# Patient Record
Sex: FEMALE | Race: WHITE | Employment: FULL TIME | URBAN - METROPOLITAN AREA
[De-identification: names, ages, dates, MRNs, and addresses within clinical notes are randomized per-mention and may not be internally consistent; named-entity substitution may affect disease eponyms.]

---

## 2017-04-06 PROBLEM — M75.81 RIGHT ROTATOR CUFF TENDONITIS: Status: ACTIVE | Noted: 2017-04-06

## 2017-04-06 PROBLEM — M75.21 BICEPS TENDONITIS, RIGHT: Status: ACTIVE | Noted: 2017-04-06

## 2017-05-31 ENCOUNTER — OFFICE VISIT (OUTPATIENT)
Dept: DERMATOLOGY CLINIC | Facility: CLINIC | Age: 22
End: 2017-05-31

## 2017-05-31 DIAGNOSIS — D23.30 BENIGN NEOPLASM OF SKIN OF FACE: ICD-10-CM

## 2017-05-31 DIAGNOSIS — D23.60 BENIGN NEOPLASM OF SKIN OF UPPER LIMB, INCLUDING SHOULDER, UNSPECIFIED LATERALITY: ICD-10-CM

## 2017-05-31 DIAGNOSIS — D23.4 BENIGN NEOPLASM OF SCALP AND SKIN OF NECK: ICD-10-CM

## 2017-05-31 DIAGNOSIS — D48.5 NEOPLASM OF UNCERTAIN BEHAVIOR OF SKIN: Primary | ICD-10-CM

## 2017-05-31 DIAGNOSIS — D23.5 BENIGN NEOPLASM OF SKIN OF TRUNK, EXCEPT SCROTUM: ICD-10-CM

## 2017-05-31 DIAGNOSIS — D22.9 MULTIPLE NEVI: ICD-10-CM

## 2017-05-31 PROCEDURE — 99998 NO SHOW: CPT | Performed by: DERMATOLOGY

## 2017-05-31 PROCEDURE — 99203 OFFICE O/P NEW LOW 30 MIN: CPT | Performed by: DERMATOLOGY

## 2017-05-31 PROCEDURE — 11100 BIOPSY OF SKIN LESION: CPT | Performed by: DERMATOLOGY

## 2017-06-02 ENCOUNTER — TELEPHONE (OUTPATIENT)
Dept: DERMATOLOGY CLINIC | Facility: CLINIC | Age: 22
End: 2017-06-02

## 2017-06-03 NOTE — TELEPHONE ENCOUNTER
Please send letter. rtc 1 yr or prn. Please check off in log book letter sent. Please send report to scan.

## 2017-06-03 NOTE — TELEPHONE ENCOUNTER
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      Logged in [employer] box

## 2017-06-19 NOTE — PROGRESS NOTES
Joel Mora is a 24year old female. Patient presents with:  Lesion: New pt here for full body check. Pls see changing mole at L upper arm (newer dark pigment). Pt also wants pink lesion checked at nose. No fam hx of skin ca.              Re performed, including scalp, head, neck, face,nails, hair, external eyes, including conjunctival mucosa, eyelids, lips, external ears, back, chest, abdomen, arms, legs, palms.   Remarkable for lesions as noted     Multiple light to medium brown, well [place] encounter diagnosis)  Benign neoplasm of skin of face  Benign neoplasm of scalp and skin of neck  Benign neoplasm of skin of upper limb, including shoulder, unspecified laterality  Benign neoplasm of skin of trunk, except scrotum  Multiple nevi      Orders

## 2017-10-25 ENCOUNTER — OFFICE VISIT (OUTPATIENT)
Dept: INTERNAL MEDICINE CLINIC | Facility: CLINIC | Age: 22
End: 2017-10-25

## 2017-10-25 VITALS
HEIGHT: 65 IN | TEMPERATURE: 99 F | SYSTOLIC BLOOD PRESSURE: 103 MMHG | WEIGHT: 200 LBS | DIASTOLIC BLOOD PRESSURE: 67 MMHG | BODY MASS INDEX: 33.32 KG/M2 | HEART RATE: 80 BPM

## 2017-10-25 DIAGNOSIS — T78.40XA HYPERSENSITIVITY, INITIAL ENCOUNTER: ICD-10-CM

## 2017-10-25 DIAGNOSIS — J45.21 MILD INTERMITTENT ASTHMA WITH ACUTE EXACERBATION: Primary | ICD-10-CM

## 2017-10-25 PROCEDURE — 99203 OFFICE O/P NEW LOW 30 MIN: CPT | Performed by: INTERNAL MEDICINE

## 2017-10-25 RX ORDER — ALBUTEROL SULFATE 90 UG/1
2 AEROSOL, METERED RESPIRATORY (INHALATION) EVERY 6 HOURS PRN
Qty: 1 INHALER | Refills: 1 | Status: SHIPPED | OUTPATIENT
Start: 2017-10-25 | End: 2019-01-11

## 2017-10-25 RX ORDER — ALBUTEROL SULFATE 90 UG/1
2 AEROSOL, METERED RESPIRATORY (INHALATION) EVERY 6 HOURS PRN
Qty: 1 INHALER | Refills: 1 | Status: SHIPPED | OUTPATIENT
Start: 2017-10-25 | End: 2017-10-25

## 2017-10-25 NOTE — PATIENT INSTRUCTIONS
Controlling Allergens: Dust Mites  Constant exposure to allergens means constant allergy symptoms. That’s why controlling or avoiding the allergens that cause your symptoms is an important part of your treatment.  If you are allergic to dust mites, the ti © 9649-4917 [employer] [address]. [address] [facility], [facility] [place]. All rights reserved. This information is not intended as a substitute for professional medical care. Always follow your healthcare professional's instructions.

## 2017-10-25 NOTE — PROGRESS NOTES
Jessica Sibley is a 20year old female. Patient presents with:  Shortness Of Breath: x2 week  Chest Congestion: Per patient started when visiting friend with dog. Cough: with shortness of breath      HPI:   Here for cough and Shortness of breath. Temp 98.9 °F (37.2 °C) (Oral)   Ht 5' 5\" (1.651 m)   Wt 200 lb (90.7 kg)   LMP 10/02/2017 (Within Days)   BMI 33.28 kg/m²   GENERAL: well developed, well nourished, NAD. SKIN: no rashes, no suspicious lesions.   HEENT: AT/NC, minimal wax noted on the righ 11/22/2017).           Dee Garay MD  10/25/2017  4:09 PM

## 2018-03-02 ENCOUNTER — OFFICE VISIT (OUTPATIENT)
Dept: INTERNAL MEDICINE CLINIC | Facility: CLINIC | Age: 23
End: 2018-03-02

## 2018-03-02 VITALS
HEART RATE: 65 BPM | HEIGHT: 65 IN | RESPIRATION RATE: 16 BRPM | SYSTOLIC BLOOD PRESSURE: 127 MMHG | BODY MASS INDEX: 29.16 KG/M2 | WEIGHT: 175 LBS | DIASTOLIC BLOOD PRESSURE: 86 MMHG

## 2018-03-02 DIAGNOSIS — Z00.00 ROUTINE PHYSICAL EXAMINATION: ICD-10-CM

## 2018-03-02 DIAGNOSIS — H91.93 BILATERAL HEARING LOSS, UNSPECIFIED HEARING LOSS TYPE: ICD-10-CM

## 2018-03-02 DIAGNOSIS — N94.6 DYSMENORRHEA: ICD-10-CM

## 2018-03-02 DIAGNOSIS — Z76.89 ENCOUNTER TO ESTABLISH CARE: Primary | ICD-10-CM

## 2018-03-02 PROCEDURE — 99212 OFFICE O/P EST SF 10 MIN: CPT | Performed by: INTERNAL MEDICINE

## 2018-03-02 PROCEDURE — 99214 OFFICE O/P EST MOD 30 MIN: CPT | Performed by: INTERNAL MEDICINE

## 2018-03-02 NOTE — ASSESSMENT & PLAN NOTE
Problems with dysmenorrhea. This did seem to improve birth control pills. Patient has been advised to give me a call with the names to be able to send in a new prescription.   Pap smear not recently completed and hence advised to set up a Pap in the next

## 2018-03-02 NOTE — PATIENT INSTRUCTIONS
Problem List Items Addressed This Visit        Unprioritized    Bilateral hearing loss     Referral for audiology provided. Relevant Orders    OP REFERRAL TO AUDIOLOGY    Dysmenorrhea     Problems with dysmenorrhea.   This did seem to improve birth

## 2018-03-02 NOTE — PROGRESS NOTES
HPI:    Patient ID: Kiarra Leyva is a 20year old female. New pt transfer of care  There is no immunization history for the selected administration types on file for this patient. Due for a tdap.    lmp 2/18/2018.   Cycles are regular,but severe well-nourished. HENT:   Right Ear: External ear normal.   Left Ear: External ear normal.   Nose: Nose normal.   Mouth/Throat: Oropharynx is clear and moist. No oropharyngeal exudate.    Eyes: Conjunctivae and EOM are normal. Pupils are equal, round, and r normal at this time. Hearing problems as discussed,no ear wax or other identified etiology at this time  Immunizations-  Due for a Tdap and HPV vaccine series. Old records of immunizations have been requested. Will update as appropriate.              Oth

## 2018-03-02 NOTE — ASSESSMENT & PLAN NOTE
Normal exam.  Labs as ordered. Skin check with multiple nevi–dermatology eval has been completed  Vision seem normal at this time.   Hearing problems as discussed,no ear wax or other identified etiology at this time  Immunizations-  Due for a Tdap and HPV

## 2018-07-08 ENCOUNTER — HOSPITAL ENCOUNTER (EMERGENCY)
Facility: HOSPITAL | Age: 23
Discharge: HOME | End: 2018-07-08
Attending: EMERGENCY MEDICINE
Payer: COMMERCIAL

## 2018-07-08 VITALS
HEART RATE: 88 BPM | OXYGEN SATURATION: 98 % | SYSTOLIC BLOOD PRESSURE: 118 MMHG | DIASTOLIC BLOOD PRESSURE: 70 MMHG | WEIGHT: 180 LBS | BODY MASS INDEX: 29.99 KG/M2 | HEIGHT: 65 IN | RESPIRATION RATE: 16 BRPM | TEMPERATURE: 98 F

## 2018-07-08 DIAGNOSIS — L55.9 SUNBURN: Primary | ICD-10-CM

## 2018-07-08 PROCEDURE — 99283 EMERGENCY DEPT VISIT LOW MDM: CPT

## 2018-07-08 PROCEDURE — 96372 THER/PROPH/DIAG INJ SC/IM: CPT

## 2018-07-08 RX ORDER — DIPHENHYDRAMINE HCL 25 MG
25 CAPSULE ORAL EVERY 6 HOURS PRN
COMMUNITY
End: 2018-12-11 | Stop reason: ALTCHOICE

## 2018-07-08 RX ORDER — HYDROXYZINE HYDROCHLORIDE 50 MG/ML
25 INJECTION, SOLUTION INTRAMUSCULAR ONCE
Status: COMPLETED | OUTPATIENT
Start: 2018-07-08 | End: 2018-07-08

## 2018-07-08 RX ORDER — HYDROXYZINE HYDROCHLORIDE 50 MG/ML
25 INJECTION, SOLUTION INTRAMUSCULAR EVERY 4 HOURS PRN
Status: DISCONTINUED | OUTPATIENT
Start: 2018-07-08 | End: 2018-07-08

## 2018-07-08 RX ORDER — IODINE/SODIUM IODIDE 2 %
TINCTURE TOPICAL EVERY 4 HOURS PRN
Status: DISCONTINUED | OUTPATIENT
Start: 2018-07-08 | End: 2018-07-09

## 2018-07-08 RX ORDER — IODINE/SODIUM IODIDE 2 %
TINCTURE TOPICAL
Qty: 1 BOTTLE | Refills: 0 | Status: SHIPPED | OUTPATIENT
Start: 2018-07-08 | End: 2018-12-11 | Stop reason: ALTCHOICE

## 2018-07-09 NOTE — ED NOTES
Pt's family came up to the desk after Calamine lotion applied, \"Is that it, she still can't stand it. \"  informed of this who came to the pt's bedside

## 2018-07-09 NOTE — ED PROVIDER NOTES
Patient Seen in: [facility] Emergency Department    History   Patient presents with:  Burn (integumentary)    Stated Complaint: sunburn    HPI    Patient's 76-year-old woman here with sunburn. She is on the sun without sun block 2 days ago.   Has so tone. Coordination normal.   Skin: Skin is warm and dry. No rash noted. There is erythema. Sunburn noted on her back abdomen. No blistering. Psychiatric: She has a normal mood and affect. Her behavior is normal.   Nursing note and vitals reviewed.

## 2018-12-10 ENCOUNTER — LAB ENCOUNTER (OUTPATIENT)
Dept: LAB | Facility: HOSPITAL | Age: 23
End: 2018-12-10
Attending: INTERNAL MEDICINE
Payer: COMMERCIAL

## 2018-12-10 DIAGNOSIS — Z00.00 ROUTINE PHYSICAL EXAMINATION: ICD-10-CM

## 2018-12-10 PROCEDURE — 84443 ASSAY THYROID STIM HORMONE: CPT

## 2018-12-10 PROCEDURE — 82607 VITAMIN B-12: CPT

## 2018-12-10 PROCEDURE — 85025 COMPLETE CBC W/AUTO DIFF WBC: CPT

## 2018-12-10 PROCEDURE — 81001 URINALYSIS AUTO W/SCOPE: CPT

## 2018-12-10 PROCEDURE — 36415 COLL VENOUS BLD VENIPUNCTURE: CPT

## 2018-12-10 PROCEDURE — 80053 COMPREHEN METABOLIC PANEL: CPT

## 2018-12-10 PROCEDURE — 82306 VITAMIN D 25 HYDROXY: CPT

## 2018-12-11 ENCOUNTER — OFFICE VISIT (OUTPATIENT)
Dept: INTERNAL MEDICINE CLINIC | Facility: CLINIC | Age: 23
End: 2018-12-11

## 2018-12-11 VITALS
WEIGHT: 202 LBS | HEART RATE: 71 BPM | SYSTOLIC BLOOD PRESSURE: 116 MMHG | BODY MASS INDEX: 33.66 KG/M2 | DIASTOLIC BLOOD PRESSURE: 80 MMHG | HEIGHT: 65 IN

## 2018-12-11 DIAGNOSIS — D22.9 ATYPICAL NEVI: ICD-10-CM

## 2018-12-11 DIAGNOSIS — N94.6 DYSMENORRHEA: Primary | ICD-10-CM

## 2018-12-11 DIAGNOSIS — Z01.419 ROUTINE GYNECOLOGICAL EXAMINATION: ICD-10-CM

## 2018-12-11 DIAGNOSIS — L91.0 KELOID OF SKIN: ICD-10-CM

## 2018-12-11 PROCEDURE — 99395 PREV VISIT EST AGE 18-39: CPT | Performed by: INTERNAL MEDICINE

## 2018-12-11 NOTE — PATIENT INSTRUCTIONS
Problem List Items Addressed This Visit        Unprioritized    Dysmenorrhea - Primary     History of severe dysmenorrhea, menorrhagia. Patient has been on birth control pills in the past.  Refills provided today.   Discussed need to avoid smoking, alcohol

## 2018-12-11 NOTE — ASSESSMENT & PLAN NOTE
Normal exam.  Skin check completed per dermatology. Patient has been normal.  There is no immunization history for the selected administration types on file for this patient. Due for Tdap and HPV vaccine series–this has been discussed with the patient.

## 2018-12-11 NOTE — ASSESSMENT & PLAN NOTE
History of severe dysmenorrhea, menorrhagia. Patient has been on birth control pills in the past.  Refills provided today. Discussed need to avoid smoking, alcohol use. Risk for DVT with immobilization has been explained. Follow-up in about 1 year.

## 2018-12-11 NOTE — PROGRESS NOTES
HPI:    Patient ID: Rajni Carpenter is a 24year old female. Pap Smear,3 Years due on 01/01/2018  Recent labs look stable.         Contraception   Progression since onset: History of irregular cycles, painful periods and would like to start on birth has normal reflexes. Skin: Skin is warm and dry. Nursing note and vitals reviewed.              ASSESSMENT/PLAN:     Problem List Items Addressed This Visit        Unprioritized    Routine gynecological examination     Normal exam.  Skin check completed

## 2018-12-24 RX ORDER — METRONIDAZOLE 500 MG/1
500 TABLET ORAL 2 TIMES DAILY
Qty: 10 TABLET | Refills: 0 | Status: SHIPPED | OUTPATIENT
Start: 2018-12-24 | End: 2018-12-29

## 2019-01-11 RX ORDER — ALBUTEROL SULFATE 90 UG/1
2 AEROSOL, METERED RESPIRATORY (INHALATION) EVERY 6 HOURS PRN
Qty: 1 INHALER | Refills: 2 | Status: SHIPPED | OUTPATIENT
Start: 2019-01-11 | End: 2019-10-19

## 2019-01-11 NOTE — TELEPHONE ENCOUNTER
Patient requesting refill for     Albuterol Sulfate  (90 Base) MCG/ACT Inhalation Aero Soln Inhale 2 puffs into the lungs every 6 (six) hours as needed for Wheezing or Shortness of Breath.  Disp: 1 Inhaler Rfl: 1     Please advise

## 2019-01-12 NOTE — TELEPHONE ENCOUNTER
Refill passed per [facility] protocol.   Refill Protocol Appointment Criteria  · Appointment scheduled in the past 6 months or in the next 3 months  Recent Outpatient Visits            1 month ago Felisa, [facility]

## 2019-10-17 ENCOUNTER — TELEPHONE (OUTPATIENT)
Dept: INTERNAL MEDICINE CLINIC | Facility: CLINIC | Age: 24
End: 2019-10-17

## 2019-10-17 NOTE — TELEPHONE ENCOUNTER
Patient called in stating that she needs a wellness exam prior to 12/15 for her employer. 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 According to her previous visit she wasn't seen for a routine physical (just had her pap on 12/11/18), so any day/month should be okay.      Please advise if Patient can be added to schedule for annual physical.

## 2019-10-19 ENCOUNTER — LAB ENCOUNTER (OUTPATIENT)
Dept: LAB | Facility: HOSPITAL | Age: 24
End: 2019-10-19
Attending: INTERNAL MEDICINE
Payer: COMMERCIAL

## 2019-10-19 ENCOUNTER — OFFICE VISIT (OUTPATIENT)
Dept: INTERNAL MEDICINE CLINIC | Facility: CLINIC | Age: 24
End: 2019-10-19

## 2019-10-19 VITALS
SYSTOLIC BLOOD PRESSURE: 118 MMHG | HEIGHT: 65 IN | WEIGHT: 166 LBS | TEMPERATURE: 98 F | OXYGEN SATURATION: 98 % | DIASTOLIC BLOOD PRESSURE: 78 MMHG | HEART RATE: 69 BPM | RESPIRATION RATE: 20 BRPM | BODY MASS INDEX: 27.66 KG/M2

## 2019-10-19 DIAGNOSIS — Z00.00 ROUTINE PHYSICAL EXAMINATION: Primary | ICD-10-CM

## 2019-10-19 DIAGNOSIS — Z00.00 ROUTINE PHYSICAL EXAMINATION: ICD-10-CM

## 2019-10-19 DIAGNOSIS — D22.9 ATYPICAL NEVI: ICD-10-CM

## 2019-10-19 DIAGNOSIS — Z23 NEED FOR VACCINATION: ICD-10-CM

## 2019-10-19 PROCEDURE — 84443 ASSAY THYROID STIM HORMONE: CPT

## 2019-10-19 PROCEDURE — 90686 IIV4 VACC NO PRSV 0.5 ML IM: CPT | Performed by: INTERNAL MEDICINE

## 2019-10-19 PROCEDURE — 99395 PREV VISIT EST AGE 18-39: CPT | Performed by: INTERNAL MEDICINE

## 2019-10-19 PROCEDURE — 80061 LIPID PANEL: CPT

## 2019-10-19 PROCEDURE — 90471 IMMUNIZATION ADMIN: CPT | Performed by: INTERNAL MEDICINE

## 2019-10-19 PROCEDURE — 36415 COLL VENOUS BLD VENIPUNCTURE: CPT

## 2019-10-19 PROCEDURE — 80053 COMPREHEN METABOLIC PANEL: CPT

## 2019-10-19 PROCEDURE — 85025 COMPLETE CBC W/AUTO DIFF WBC: CPT

## 2019-10-19 RX ORDER — ALBUTEROL SULFATE 90 UG/1
2 AEROSOL, METERED RESPIRATORY (INHALATION) EVERY 6 HOURS PRN
Qty: 1 INHALER | Refills: 2 | Status: SHIPPED | OUTPATIENT
Start: 2019-10-19 | End: 2020-10-20

## 2019-10-19 RX ORDER — NORETHINDRONE ACETATE AND ETHINYL ESTRADIOL, ETHINYL ESTRADIOL AND FERROUS FUMARATE 1MG-10(24)
1 KIT ORAL DAILY
Refills: 1 | COMMUNITY
Start: 2019-07-07 | End: 2019-12-15

## 2019-10-19 NOTE — ASSESSMENT & PLAN NOTE
Normal exam.  Labs as ordered. Skin check normal.  No significant abnormal nevi. Scattered nevi present–advised dermatology follow-up once a Polina Quinones, [phone] for an appointment.   Breast exam completed–no palpable abnormalities, discharge from

## 2019-10-19 NOTE — PATIENT INSTRUCTIONS
Problem List Items Addressed This Visit        Unprioritized    Routine physical examination - Primary     Normal exam.  Labs as ordered. Skin check normal.  No significant abnormal nevi.   Scattered nevi present–advised dermatology follow-up once a year–D

## 2019-10-19 NOTE — PROGRESS NOTES
REASON FOR VISIT:    Isael Hoover is a 21year old female who presents for an [address].     Pap Smear,3 Years due on 12/11/2021  Immunization History   Administered Date(s) Administered   • Tb Intradermal Test 09/03/2016     Flu shot Diabetes Screening  if history of high blood pressure or other  risk factors No results found for: A1C  Glucose (mg/dL)   Date Value   12/10/2018 94         Gonorrhea Screening if high risk No results found for: GONOCOCCUS    HIV Screening For all adults past medical history.    Past Surgical History:   Procedure Laterality Date   • ADENOIDECTOMY     • TONSILLECTOMY        Family History   Problem Relation Age of Onset   • Arthritis Mother    • Hypertension Mother    • Obesity Mother    • Depression Mother murmur  GI: good BS's, no masses, HSM or tenderness  Genitourinary:    External Gyn:  Pubic hair is normally distributed.  External genitalia is unremarkable. Glands do appear to be normal.  Perineum is unremarkable.  No perianal abnormalities.    Urethra i ASSAY, THYROID STIM HORMONE    URINALYSIS, ROUTINE      Other Visit Diagnoses     Need for vaccination        Relevant Orders    FLULAVAL INFLUENZA VACCINE QUAD PRESERVATIVE FREE 0.5 ML    Atypical nevi        Relevant Orders    DERM - INTERNAL

## 2019-10-19 NOTE — TELEPHONE ENCOUNTER
Patient contacted. Advised to see me tomorrow at 9:45 AM at the [facility].

## 2019-12-16 RX ORDER — NORETHINDRONE ACETATE AND ETHINYL ESTRADIOL, ETHINYL ESTRADIOL AND FERROUS FUMARATE 1MG-10(24)
KIT ORAL
Qty: 84 TABLET | Refills: 1 | Status: SHIPPED | OUTPATIENT
Start: 2019-12-16 | End: 2020-05-27

## 2019-12-16 NOTE — TELEPHONE ENCOUNTER
Refill passed per [facility] protocol.   Gynecology Medications  Protocol Criteria:  · Appointment scheduled in the past 12 months or the next 3 months  · Pap smear in the past 12 months  · Pap smear WNL manually verified  Recent Outpatient Visits

## 2019-12-18 ENCOUNTER — HOSPITAL ENCOUNTER (OUTPATIENT)
Age: 24
Discharge: HOME | End: 2019-12-18
Attending: FAMILY MEDICINE
Payer: COMMERCIAL

## 2019-12-18 VITALS
DIASTOLIC BLOOD PRESSURE: 68 MMHG | HEART RATE: 72 BPM | TEMPERATURE: 98 F | OXYGEN SATURATION: 99 % | HEIGHT: 65 IN | SYSTOLIC BLOOD PRESSURE: 117 MMHG | BODY MASS INDEX: 28.32 KG/M2 | WEIGHT: 170 LBS | RESPIRATION RATE: 18 BRPM

## 2019-12-18 DIAGNOSIS — J02.9 ACUTE PHARYNGITIS, UNSPECIFIED ETIOLOGY: Primary | ICD-10-CM

## 2019-12-18 DIAGNOSIS — J06.9 ACUTE URI: ICD-10-CM

## 2019-12-18 PROCEDURE — 87081 CULTURE SCREEN ONLY: CPT

## 2019-12-18 PROCEDURE — 99202 OFFICE O/P NEW SF 15 MIN: CPT

## 2019-12-18 PROCEDURE — 87430 STREP A AG IA: CPT

## 2019-12-18 PROCEDURE — 99212 OFFICE O/P EST SF 10 MIN: CPT

## 2019-12-18 RX ORDER — IBUPROFEN 600 MG/1
600 TABLET ORAL EVERY 8 HOURS PRN
Qty: 30 TABLET | Refills: 0 | Status: SHIPPED | OUTPATIENT
Start: 2019-12-18 | End: 2019-12-25

## 2019-12-18 NOTE — ED PROVIDER NOTES
Patient Seen in: [facility]      History   Patient presents with:  Sore Throat    Stated Complaint: sore throat x 3 days    HPI    40-year-old female presents with complaints of sore throat for 3 days associated with nasal pain   Salt water gargles   If has worsening symptoms, drooling from mouth and unable to swallow go to ER                   Disposition and Plan     Clinical Impression:  Acute pharyngitis, unspecified etiology  (primary encounter diagnosis)  Acute URI

## 2019-12-21 NOTE — ED NOTES
Attempted to contact the patient regarding neg strep culture results but was unsuccessful. Left a message for the patient to call us back.

## 2020-03-13 ENCOUNTER — NURSE TRIAGE (OUTPATIENT)
Dept: INTERNAL MEDICINE CLINIC | Facility: CLINIC | Age: 25
End: 2020-03-13

## 2020-03-13 NOTE — TELEPHONE ENCOUNTER
Action Requested: Summary for Provider     []  Critical Lab, Recommendations Needed  [] Need Additional Advice  []   FYI    []   Need Orders  [] Need Medications Sent to Pharmacy  []  Other     SUMMARY: Patient c/o recurrent headache 7/10 around the top of

## 2020-05-27 RX ORDER — NORETHINDRONE ACETATE AND ETHINYL ESTRADIOL, ETHINYL ESTRADIOL AND FERROUS FUMARATE 1MG-10(24)
KIT ORAL
Qty: 84 TABLET | Refills: 1 | Status: SHIPPED | OUTPATIENT
Start: 2020-05-27 | End: 2020-12-28

## 2020-09-30 ENCOUNTER — HOSPITAL ENCOUNTER (OUTPATIENT)
Age: 25
Discharge: HOME | End: 2020-09-30
Payer: COMMERCIAL

## 2020-09-30 VITALS
TEMPERATURE: 98 F | BODY MASS INDEX: 31.65 KG/M2 | RESPIRATION RATE: 18 BRPM | OXYGEN SATURATION: 98 % | HEIGHT: 65 IN | HEART RATE: 92 BPM | DIASTOLIC BLOOD PRESSURE: 76 MMHG | WEIGHT: 190 LBS | SYSTOLIC BLOOD PRESSURE: 141 MMHG

## 2020-09-30 DIAGNOSIS — Z20.822 ENCOUNTER FOR SCREENING LABORATORY TESTING FOR COVID-19 VIRUS: ICD-10-CM

## 2020-09-30 DIAGNOSIS — R09.81 NASAL CONGESTION: Primary | ICD-10-CM

## 2020-09-30 PROCEDURE — 99202 OFFICE O/P NEW SF 15 MIN: CPT | Performed by: PHYSICIAN ASSISTANT

## 2020-09-30 RX ORDER — ALBUTEROL SULFATE 90 UG/1
2 AEROSOL, METERED RESPIRATORY (INHALATION) EVERY 4 HOURS PRN
Qty: 1 INHALER | Refills: 0 | Status: SHIPPED | OUTPATIENT
Start: 2020-09-30 | End: 2020-10-20

## 2020-10-01 NOTE — ED PROVIDER NOTES
Patient Seen in: [facility]      History   Patient presents with:  Testing    Stated Complaint: covid test - sob and chest tightness x 3 days     HPI    43-year-old female presents to the clinic for evaluation of nasal fidelia External ear normal.      Left Ear: External ear normal.      Nose: Rhinorrhea present. Mouth/Throat:      Mouth: Mucous membranes are moist.      Pharynx: Oropharynx is clear.    Eyes:      Conjunctiva/sclera: Conjunctivae normal.   Neck:      Musculo Wheezing. Qty: 1 Inhaler Refills: 0    !! - Potential duplicate medications found. Please discuss with provider.

## 2020-10-01 NOTE — ED INITIAL ASSESSMENT (HPI)
Pt here c/o sob and chest discomfort for last couple days. Hx of asthma, using inhaler. Pt here for covid testing.

## 2020-10-03 ENCOUNTER — PATIENT MESSAGE (OUTPATIENT)
Dept: INTERNAL MEDICINE CLINIC | Facility: CLINIC | Age: 25
End: 2020-10-03

## 2020-10-03 ENCOUNTER — TELEPHONE (OUTPATIENT)
Dept: INTERNAL MEDICINE CLINIC | Facility: CLINIC | Age: 25
End: 2020-10-03

## 2020-10-03 NOTE — TELEPHONE ENCOUNTER
----- Message from Chito Montano sent at 10/3/2020  9:36 AM CDT -----  Regarding: Other  Contact: [phone]  Gi Bojorquez Speaker been having some COVID like symptoms, so I had a test done and it came back negative.  I'm still experiencing s

## 2020-10-03 NOTE — TELEPHONE ENCOUNTER
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  I'm still experiencing shortness of breath, hea

## 2020-10-05 NOTE — TELEPHONE ENCOUNTER
The patient stated she does not have any symptoms now and returned to work    The patient denies any shortness of breath, chest pains, fatigue,, soreness, wheezing or chest tightness. She stated feels fine. She will call back if the symptoms return.

## 2020-10-18 ENCOUNTER — PATIENT MESSAGE (OUTPATIENT)
Dept: INTERNAL MEDICINE CLINIC | Facility: CLINIC | Age: 25
End: 2020-10-18

## 2020-10-18 ENCOUNTER — TELEPHONE (OUTPATIENT)
Dept: FAMILY MEDICINE CLINIC | Facility: CLINIC | Age: 25
End: 2020-10-18

## 2020-10-18 NOTE — TELEPHONE ENCOUNTER
From: Kathy Rodriguez  To:  Trey Durbin MD  Sent: 10/18/2020 11:45 AM CDT  Subject: Non-Urgent Erleen Labor Dr. Morena Boswell,     I have been having trouble breathing and tightness in my chest. I have been using my inhaler several times a day,

## 2020-10-18 NOTE — TELEPHONE ENCOUNTER
Advised ER evaluation. Triage RN, please follow up. Thanks.     Non-Urgent Medical Berny Trejo MD 2 hours ago (11:45 AM)        Gi Grove,      I have been having trouble breathing and tightness in my chest. I hav

## 2020-10-19 NOTE — TELEPHONE ENCOUNTER
Yoav Farrell MD  [place] 16 hours ago (3:23 PM)   NOELLE please assist per Dr luz Castro,  Please set up a video visit-doximity for an eval-end of the day any day after 7 pm.  Please have staff set this up.   Thanks,  Dr Alex Lopez

## 2020-10-20 ENCOUNTER — TELEMEDICINE (OUTPATIENT)
Dept: INTERNAL MEDICINE CLINIC | Facility: CLINIC | Age: 25
End: 2020-10-20

## 2020-10-20 DIAGNOSIS — J45.40 MODERATE PERSISTENT ASTHMA WITHOUT COMPLICATION: Primary | ICD-10-CM

## 2020-10-20 PROCEDURE — 99214 OFFICE O/P EST MOD 30 MIN: CPT | Performed by: INTERNAL MEDICINE

## 2020-10-20 RX ORDER — ALBUTEROL SULFATE 90 UG/1
2 AEROSOL, METERED RESPIRATORY (INHALATION) EVERY 6 HOURS PRN
Qty: 3 INHALER | Refills: 3 | Status: SHIPPED | OUTPATIENT
Start: 2020-10-20 | End: 2021-11-10

## 2020-10-20 RX ORDER — MONTELUKAST SODIUM 10 MG/1
10 TABLET ORAL DAILY
Qty: 90 TABLET | Refills: 1 | Status: SHIPPED | OUTPATIENT
Start: 2020-10-20 | End: 2021-10-15

## 2020-10-20 RX ORDER — FLUTICASONE PROPIONATE AND SALMETEROL 100; 50 UG/1; UG/1
1 POWDER RESPIRATORY (INHALATION) 2 TIMES DAILY
Qty: 3 PACKAGE | Refills: 3 | Status: SHIPPED | OUTPATIENT
Start: 2020-10-20 | End: 2020-12-28

## 2020-10-21 NOTE — PROGRESS NOTES
HPI:    Patient ID: Olive Duarte is a 31year old female.   Telehealth outside of [facility] [address] Verbal Consent   I conducted a telehealth visit with Olive Duarte today, 10/20/20, which was completed using two-way, real-time interactive a uses albuterol about 4-5 times a day but continues to wheeze, no fevers, chills, Covid negative, no sore throat. ). Associated symptoms include headaches (sinus pressure) and wheezing.  Pertinent negatives include no abdominal pain, chest pain, fever, leg pa TIME DAILY  84 tablet 1     Allergies:No Known Allergies     There were no vitals filed for this visit. There is no height or weight on file to calculate BMI. PHYSICAL EXAM:   Physical Exam   Constitutional: She is oriented to person, place, and time. albuterol 2 puffs 2-3 times a day if needed as a rescue inhaler. Claritin 10 mg 1 tablet once daily over-the-counter, Singulair 10 mg 1 tablet once daily as a prescription to be picked up from the local pharmacy. Call in the next 2 weeks if not better.

## 2020-10-21 NOTE — PATIENT INSTRUCTIONS
Problem List Items Addressed This Visit        Unprioritized    Moderate persistent asthma without complication - Primary     Patient with a history of mild intermittent asthma on exposure to allergens and animals.   Over the past 4 weeks when the weather t

## 2020-12-28 RX ORDER — NORETHINDRONE ACETATE AND ETHINYL ESTRADIOL, ETHINYL ESTRADIOL AND FERROUS FUMARATE 1MG-10(24)
1 KIT ORAL DAILY
Qty: 84 TABLET | Refills: 1 | Status: SHIPPED | OUTPATIENT
Start: 2020-12-28 | End: 2021-05-02

## 2020-12-28 RX ORDER — FLUTICASONE PROPIONATE AND SALMETEROL 100; 50 UG/1; UG/1
1 POWDER RESPIRATORY (INHALATION) 2 TIMES DAILY
Qty: 3 PACKAGE | Refills: 3 | Status: SHIPPED | OUTPATIENT
Start: 2020-12-28 | End: 2021-12-23

## 2021-02-01 DIAGNOSIS — Z23 NEED FOR VACCINATION: ICD-10-CM

## 2021-02-03 ENCOUNTER — IMMUNIZATION (OUTPATIENT)
Dept: LAB | Age: 26
End: 2021-02-03
Attending: HOSPITALIST
Payer: COMMERCIAL

## 2021-02-03 DIAGNOSIS — Z23 NEED FOR VACCINATION: Primary | ICD-10-CM

## 2021-02-03 PROCEDURE — 0001A SARSCOV2 VAC 30MCG/0.3ML IM: CPT

## 2021-02-24 ENCOUNTER — IMMUNIZATION (OUTPATIENT)
Dept: LAB | Age: 26
End: 2021-02-24
Attending: HOSPITALIST
Payer: COMMERCIAL

## 2021-02-24 DIAGNOSIS — Z23 NEED FOR VACCINATION: Primary | ICD-10-CM

## 2021-02-24 PROCEDURE — 0002A SARSCOV2 VAC 30MCG/0.3ML IM: CPT

## 2021-05-02 RX ORDER — NORETHINDRONE ACETATE AND ETHINYL ESTRADIOL, ETHINYL ESTRADIOL AND FERROUS FUMARATE 1MG-10(24)
1 KIT ORAL DAILY
Qty: 84 TABLET | Refills: 1 | Status: SHIPPED | OUTPATIENT
Start: 2021-05-02 | End: 2021-07-22

## 2021-07-22 RX ORDER — NORETHINDRONE ACETATE AND ETHINYL ESTRADIOL, ETHINYL ESTRADIOL AND FERROUS FUMARATE 1MG-10(24)
1 KIT ORAL DAILY
Qty: 84 TABLET | Refills: 1 | Status: SHIPPED | OUTPATIENT
Start: 2021-07-22 | End: 2021-11-10

## 2021-07-22 NOTE — TELEPHONE ENCOUNTER
Refill passed per [place] [facility] protocol. Requested Prescriptions   Pending Prescriptions Disp Refills    Norethin-Eth Estrad-Fe Biphas (LO LOESTRIN FE) 1 MG-10 MCG / 10 MCG Oral Tab 84 tablet 1     Sig: Take 1 tablet by mouth daily.         Gynecology Rosangela Estes

## 2021-10-28 ENCOUNTER — OFFICE VISIT (OUTPATIENT)
Dept: INTERNAL MEDICINE CLINIC | Facility: CLINIC | Age: 26
End: 2021-10-28

## 2021-10-28 VITALS
WEIGHT: 202 LBS | RESPIRATION RATE: 16 BRPM | BODY MASS INDEX: 33.66 KG/M2 | HEIGHT: 65 IN | TEMPERATURE: 99 F | DIASTOLIC BLOOD PRESSURE: 81 MMHG | HEART RATE: 75 BPM | SYSTOLIC BLOOD PRESSURE: 119 MMHG

## 2021-10-28 DIAGNOSIS — Z23 NEED FOR VACCINATION: ICD-10-CM

## 2021-10-28 DIAGNOSIS — Z01.419 ENCOUNTER FOR GYNECOLOGICAL EXAMINATION WITHOUT ABNORMAL FINDING: ICD-10-CM

## 2021-10-28 DIAGNOSIS — G44.219 EPISODIC TENSION-TYPE HEADACHE, NOT INTRACTABLE: ICD-10-CM

## 2021-10-28 DIAGNOSIS — Z00.00 ROUTINE PHYSICAL EXAMINATION: Primary | ICD-10-CM

## 2021-10-28 PROBLEM — G44.209 TENSION TYPE HEADACHE: Status: ACTIVE | Noted: 2021-10-28

## 2021-10-28 PROCEDURE — 90471 IMMUNIZATION ADMIN: CPT | Performed by: INTERNAL MEDICINE

## 2021-10-28 PROCEDURE — 3079F DIAST BP 80-89 MM HG: CPT | Performed by: INTERNAL MEDICINE

## 2021-10-28 PROCEDURE — 99213 OFFICE O/P EST LOW 20 MIN: CPT | Performed by: INTERNAL MEDICINE

## 2021-10-28 PROCEDURE — 99395 PREV VISIT EST AGE 18-39: CPT | Performed by: INTERNAL MEDICINE

## 2021-10-28 PROCEDURE — 3074F SYST BP LT 130 MM HG: CPT | Performed by: INTERNAL MEDICINE

## 2021-10-28 PROCEDURE — 3008F BODY MASS INDEX DOCD: CPT | Performed by: INTERNAL MEDICINE

## 2021-10-28 PROCEDURE — 90686 IIV4 VACC NO PRSV 0.5 ML IM: CPT | Performed by: INTERNAL MEDICINE

## 2021-10-28 RX ORDER — TIZANIDINE 2 MG/1
2 TABLET ORAL NIGHTLY
Qty: 30 TABLET | Refills: 2 | Status: SHIPPED | OUTPATIENT
Start: 2021-10-28

## 2021-10-28 NOTE — ASSESSMENT & PLAN NOTE
Longstanding history of headaches–about 1 or 2/month. Usually frontal, occipital and occasionally to the sides. No visual aura. No nausea or vomiting. Sometimes last for almost the entire day.   No phonophobia, photophobia, altered mental status, numbne

## 2021-10-28 NOTE — PROGRESS NOTES
HPI:   Ryley Dc is a 31year old female who presents for an Annual Health Visit.        Allergies:   No Known Allergies    CURRENT MEDICATIONS   Current Outpatient Medications   Medication Sig Dispense Refill   • tiZANidine 2 MG Oral Tab Take (Temporal)   Resp 16   Ht 5' 5\" (1.651 m)   Wt 202 lb (91.6 kg)   LMP 10/01/2021   BMI 33.61 kg/m²    Wt Readings from Last 6 Encounters:  10/28/21 : 202 lb (91.6 kg)  09/30/20 : 190 lb (86.2 kg)  12/18/19 : 170 lb (77.1 kg)  10/19/19 : 166 lb (75.3 kg) unremarkable.  No perianal abnormalities.    Urethra is normal in appearance, without erythema.  Urethra meatus is normal.    Internal Gyn:     Vaginal mucosa appears normal. Cervix normal to inspection and palpation.  Uterus normal in size and position.  a by mouth nightly. Patient Instructions     Problem List Items Addressed This Visit        Unprioritized    Encounter for routine gynecological examination     Pelvic exam completed, Pap completed.   HPV vaccine series recommended, patient has not com day.  Zyrtec 10 mg 1 tablet once daily at bedtime. If symptoms do not improve–will need a follow-up evaluation. Tizanidine 2 mg at bedtime to help with muscle tension. She is on Loestrin Fe which too may contribute to headaches.   Maintain a headache moni

## 2021-10-28 NOTE — PATIENT INSTRUCTIONS
Problem List Items Addressed This Visit        Unprioritized    Encounter for routine gynecological examination     Pelvic exam completed, Pap completed. HPV vaccine series recommended, patient has not completed it as yet. Tdap recommended.          Parry Runner bedtime. If symptoms do not improve–will need a follow-up evaluation. Tizanidine 2 mg at bedtime to help with muscle tension. She is on Loestrin Fe which too may contribute to headaches. Maintain a headache diary as discussed today.   Follow-up after la

## 2021-10-28 NOTE — ASSESSMENT & PLAN NOTE
Normal exam.  Labs as ordered. Skin check normal.  Multiple scattered nevi and freckles present. Patient is advised to follow-up with dermatology for an evaluation.   Breast exam completed–no palpable abnormalities, discharge from the nipples or axillary

## 2021-10-28 NOTE — ASSESSMENT & PLAN NOTE
Pelvic exam completed, Pap completed. HPV vaccine series recommended, patient has not completed it as yet. Tdap recommended.

## 2021-11-10 RX ORDER — ALBUTEROL SULFATE 90 UG/1
2 AEROSOL, METERED RESPIRATORY (INHALATION) EVERY 6 HOURS PRN
Qty: 1 EACH | Refills: 2 | Status: SHIPPED | OUTPATIENT
Start: 2021-11-10

## 2021-11-10 RX ORDER — NORETHINDRONE ACETATE AND ETHINYL ESTRADIOL, ETHINYL ESTRADIOL AND FERROUS FUMARATE 1MG-10(24)
1 KIT ORAL DAILY
Qty: 84 TABLET | Refills: 1 | Status: SHIPPED | OUTPATIENT
Start: 2021-11-10

## 2021-11-11 RX ORDER — NORETHINDRONE ACETATE AND ETHINYL ESTRADIOL, ETHINYL ESTRADIOL AND FERROUS FUMARATE 1MG-10(24)
1 KIT ORAL DAILY
Qty: 84 TABLET | Refills: 2 | Status: SHIPPED | OUTPATIENT
Start: 2021-11-11

## 2021-11-11 RX ORDER — ALBUTEROL SULFATE 90 UG/1
2 AEROSOL, METERED RESPIRATORY (INHALATION) EVERY 6 HOURS PRN
Qty: 3 EACH | Refills: 1 | Status: SHIPPED | OUTPATIENT
Start: 2021-11-11

## 2021-11-11 RX ORDER — ALBUTEROL SULFATE 90 UG/1
2 AEROSOL, METERED RESPIRATORY (INHALATION) EVERY 6 HOURS PRN
Refills: 0 | OUTPATIENT
Start: 2021-11-11

## 2021-11-11 RX ORDER — NORETHINDRONE ACETATE AND ETHINYL ESTRADIOL, ETHINYL ESTRADIOL AND FERROUS FUMARATE 1MG-10(24)
1 KIT ORAL DAILY
Qty: 84 TABLET | Refills: 1 | OUTPATIENT
Start: 2021-11-11

## 2021-11-11 NOTE — TELEPHONE ENCOUNTER
Last PAP 10/28/2021      Refill passed per [facility] protocol.   Requested Prescriptions   Pending Prescriptions Disp Refills    albuterol 108 (90 Base) MCG/ACT Inhalation Aero Soln  0     Sig: Inhale 2 puffs into the lungs every 6 (six) hours as need

## 2021-12-11 ENCOUNTER — IMMUNIZATION (OUTPATIENT)
Dept: LAB | Facility: HOSPITAL | Age: 26
End: 2021-12-11
Attending: EMERGENCY MEDICINE
Payer: COMMERCIAL

## 2021-12-11 DIAGNOSIS — Z23 NEED FOR VACCINATION: Primary | ICD-10-CM

## 2021-12-11 PROCEDURE — 0004A SARSCOV2 VAC 30MCG/0.3ML IM: CPT

## 2021-12-14 ENCOUNTER — LAB ENCOUNTER (OUTPATIENT)
Dept: LAB | Facility: HOSPITAL | Age: 26
End: 2021-12-14
Attending: INTERNAL MEDICINE
Payer: COMMERCIAL

## 2021-12-14 DIAGNOSIS — Z00.00 ROUTINE PHYSICAL EXAMINATION: ICD-10-CM

## 2021-12-14 PROCEDURE — 82306 VITAMIN D 25 HYDROXY: CPT

## 2021-12-14 PROCEDURE — 84443 ASSAY THYROID STIM HORMONE: CPT

## 2021-12-14 PROCEDURE — 36415 COLL VENOUS BLD VENIPUNCTURE: CPT

## 2021-12-14 PROCEDURE — 82607 VITAMIN B-12: CPT

## 2021-12-14 PROCEDURE — 80061 LIPID PANEL: CPT

## 2021-12-14 PROCEDURE — 85025 COMPLETE CBC W/AUTO DIFF WBC: CPT

## 2021-12-14 PROCEDURE — 80053 COMPREHEN METABOLIC PANEL: CPT

## 2021-12-28 ENCOUNTER — PATIENT MESSAGE (OUTPATIENT)
Dept: INTERNAL MEDICINE CLINIC | Facility: CLINIC | Age: 26
End: 2021-12-28

## 2021-12-28 ENCOUNTER — TELEPHONE (OUTPATIENT)
Dept: FAMILY MEDICINE CLINIC | Facility: CLINIC | Age: 26
End: 2021-12-28

## 2021-12-28 DIAGNOSIS — Z20.822 EXPOSURE TO COVID-19 VIRUS: Primary | ICD-10-CM

## 2021-12-29 ENCOUNTER — NURSE ONLY (OUTPATIENT)
Dept: LAB | Age: 26
End: 2021-12-29
Attending: FAMILY MEDICINE
Payer: COMMERCIAL

## 2021-12-29 DIAGNOSIS — Z20.822 EXPOSURE TO COVID-19 VIRUS: ICD-10-CM

## 2021-12-29 NOTE — TELEPHONE ENCOUNTER
Call placed to patient who endorses mild symptoms following an exposure on 12/25 to a person now known to have been COVID positive. Symptoms include headache, chest congestion and sore throat. Order entered. Care advice provided.  ED precautions revi

## 2021-12-29 NOTE — TELEPHONE ENCOUNTER
From: Lisa Rodriguez  To: Richard Iraheta MD  Sent: 12/28/2021 9:58 AM CST  Subject: Gigi Laboy,     Where can I go to get Covid tested? I was in close contact with someone who has Covid. I need a test as soon as possible.

## 2021-12-29 NOTE — TELEPHONE ENCOUNTER
RN pls call pt and pls triage or offer ov if needed, thanks. Alinity pended.            To: KAREN OZUNA TRIAGE      From: Lexi Donnelly      Created: 12/28/2021 9:43 PM      I have a tight chest, headaches and slight sore throat.              : KAREN MANRIQUE

## 2021-12-30 ENCOUNTER — LAB ENCOUNTER (OUTPATIENT)
Dept: LAB | Facility: HOSPITAL | Age: 26
End: 2021-12-30
Attending: INTERNAL MEDICINE
Payer: COMMERCIAL

## 2021-12-30 PROCEDURE — 81001 URINALYSIS AUTO W/SCOPE: CPT

## 2022-01-01 LAB — SARS-COV-2 RNA RESP QL NAA+PROBE: NOT DETECTED

## 2022-05-29 ENCOUNTER — HOSPITAL ENCOUNTER (OUTPATIENT)
Age: 27
Discharge: HOME | End: 2022-05-29
Payer: COMMERCIAL

## 2022-05-29 VITALS
RESPIRATION RATE: 16 BRPM | DIASTOLIC BLOOD PRESSURE: 71 MMHG | BODY MASS INDEX: 31.65 KG/M2 | WEIGHT: 190 LBS | TEMPERATURE: 98 F | OXYGEN SATURATION: 99 % | HEART RATE: 72 BPM | SYSTOLIC BLOOD PRESSURE: 122 MMHG | HEIGHT: 65 IN

## 2022-05-29 DIAGNOSIS — U07.1 COVID-19: Primary | ICD-10-CM

## 2022-05-29 LAB
S PYO AG THROAT QL: NEGATIVE
SARS-COV-2 RNA RESP QL NAA+PROBE: DETECTED

## 2023-11-11 ENCOUNTER — HOSPITAL ENCOUNTER (OUTPATIENT)
Age: 28
Discharge: HOME | End: 2023-11-11
Payer: COMMERCIAL

## 2023-11-11 ENCOUNTER — APPOINTMENT (OUTPATIENT)
Dept: GENERAL RADIOLOGY | Age: 28
End: 2023-11-11
Attending: PHYSICIAN ASSISTANT
Payer: COMMERCIAL

## 2023-11-11 VITALS
DIASTOLIC BLOOD PRESSURE: 76 MMHG | TEMPERATURE: 97 F | SYSTOLIC BLOOD PRESSURE: 116 MMHG | RESPIRATION RATE: 18 BRPM | HEART RATE: 82 BPM | OXYGEN SATURATION: 97 %

## 2023-11-11 DIAGNOSIS — R06.2 WHEEZING: Primary | ICD-10-CM

## 2023-11-11 DIAGNOSIS — R09.82 PND (POST-NASAL DRIP): ICD-10-CM

## 2023-11-11 LAB — SARS-COV-2 RNA RESP QL NAA+PROBE: NOT DETECTED

## 2023-11-11 PROCEDURE — 94640 AIRWAY INHALATION TREATMENT: CPT | Performed by: PHYSICIAN ASSISTANT

## 2023-11-11 PROCEDURE — U0002 COVID-19 LAB TEST NON-CDC: HCPCS | Performed by: PHYSICIAN ASSISTANT

## 2023-11-11 PROCEDURE — 99213 OFFICE O/P EST LOW 20 MIN: CPT | Performed by: PHYSICIAN ASSISTANT

## 2023-11-11 PROCEDURE — 71046 X-RAY EXAM CHEST 2 VIEWS: CPT | Performed by: PHYSICIAN ASSISTANT

## 2023-11-11 RX ORDER — PREDNISONE 20 MG/1
40 TABLET ORAL DAILY
Qty: 8 TABLET | Refills: 0 | Status: SHIPPED | OUTPATIENT
Start: 2023-11-11 | End: 2023-11-15

## 2023-11-11 RX ORDER — IPRATROPIUM BROMIDE AND ALBUTEROL SULFATE 2.5; .5 MG/3ML; MG/3ML
3 SOLUTION RESPIRATORY (INHALATION) ONCE
Status: COMPLETED | OUTPATIENT
Start: 2023-11-11 | End: 2023-11-11

## 2023-11-11 RX ORDER — ALBUTEROL SULFATE 90 UG/1
2 AEROSOL, METERED RESPIRATORY (INHALATION) EVERY 4 HOURS PRN
Qty: 1 EACH | Refills: 0 | Status: SHIPPED | OUTPATIENT
Start: 2023-11-11 | End: 2023-12-11

## 2023-11-11 NOTE — ED INITIAL ASSESSMENT (HPI)
Pt with seasonal allergies and asthma.   For the past week she has headaches, ear pressure, and difficult to take a deep breath and her inhaler only helps short term

## 2023-11-11 NOTE — DISCHARGE INSTRUCTIONS
Please return to the ER/clinic if symptoms worsen. Follow-up with your PCP in 24-48 hours as needed. Push fluids. The Decadron will work in your system the next several days. You may start the additional prednisone on day 2 or 3 if symptoms persist.  Your inhaler every 4-6 hours as needed. Recommend sleeping more upright. Take an over-the-counter antihistamine daily i.e. Zyrtec. Use Chloraseptic spray to help stop the cough trigger reflex. If symptoms persist or worsen i.e. increasing wheezing shortness of breath or fevers go directly to the emergency room. Otherwise follow-up with your primary care physician for further evaluation and treatment.

## 2024-05-11 ENCOUNTER — HOSPITAL ENCOUNTER (OUTPATIENT)
Age: 29
Discharge: HOME | End: 2024-05-11

## 2024-05-11 ENCOUNTER — APPOINTMENT (OUTPATIENT)
Dept: GENERAL RADIOLOGY | Age: 29
End: 2024-05-11
Attending: NURSE PRACTITIONER

## 2024-05-11 VITALS
BODY MASS INDEX: 29.99 KG/M2 | WEIGHT: 180 LBS | HEART RATE: 71 BPM | HEIGHT: 65 IN | SYSTOLIC BLOOD PRESSURE: 125 MMHG | TEMPERATURE: 98 F | DIASTOLIC BLOOD PRESSURE: 67 MMHG | OXYGEN SATURATION: 100 % | RESPIRATION RATE: 20 BRPM

## 2024-05-11 DIAGNOSIS — M25.572 ACUTE LEFT ANKLE PAIN: Primary | ICD-10-CM

## 2024-05-11 DIAGNOSIS — S93.402A MILD SPRAIN OF LEFT ANKLE, INITIAL ENCOUNTER: ICD-10-CM

## 2024-05-11 PROCEDURE — 73610 X-RAY EXAM OF ANKLE: CPT | Performed by: NURSE PRACTITIONER

## 2024-05-11 PROCEDURE — E0114 CRUTCH UNDERARM PAIR NO WOOD: HCPCS | Performed by: NURSE PRACTITIONER

## 2024-05-11 PROCEDURE — 99213 OFFICE O/P EST LOW 20 MIN: CPT | Performed by: NURSE PRACTITIONER

## 2024-05-11 PROCEDURE — L4350 ANKLE CONTROL ORTHO PRE OTS: HCPCS | Performed by: NURSE PRACTITIONER

## 2024-05-11 RX ORDER — ARM BRACE
EACH MISCELLANEOUS
Qty: 1 EACH | Refills: 0 | Status: SHIPPED | OUTPATIENT
Start: 2024-05-11

## 2024-05-11 NOTE — ED PROVIDER NOTES
Patient Seen in: [facility]      History     Chief Complaint   Patient presents with    Leg or Foot Injury     Stated Complaint: Lt foot injury    Subjective:   27 yo female presents with left ankle pain that began 1 day ago after playing ball outside with her son.   Pt states she landed on uneven surface causing her to roll her ankle.  Today the ankle is more swollen.   There was no other trauma.   Having pain when walking.   No prior injuries to this ankle/foot.    Pt denies numbness, tingling.         The history is provided by the patient.         Objective:   History reviewed. No pertinent past medical history.           Past Surgical History:   Procedure Laterality Date    Adenoidectomy      Tonsillectomy                  Social History     Socioeconomic History    Marital status:    Tobacco Use    Smoking status: Never    Smokeless tobacco: Never   Vaping Use    Vaping status: Never Used   Substance and Sexual Activity    Alcohol use: No     Alcohol/week: 0.0 standard drinks of alcohol    Drug use: No    Sexual activity: Yes     Birth control/protection: Pill   Other Topics Concern    Pt has a pacemaker No    Reaction to local anesthetic No              Review of Systems   All other systems reviewed and are negative.      Positive for stated complaint: Lt foot injury  Other systems are as noted in HPI.  Constitutional and vital signs reviewed.      All other systems reviewed and negative except as noted above.    Physical Exam     ED Triage Vitals [05/11/24 1045]   /67   Pulse 71   Resp 20   Temp 97.5 °F (36.4 °C)   Temp src Temporal   SpO2 100 %   O2 Device None (Room air)       Current Vitals:   Vital Signs  BP: 125/67  Pulse: 71  Resp: 20  Temp: 97.5 °F (36.4 °C)  Temp src: Temporal    Oxygen Therapy  SpO2: 100 %  O2 Device: None (Room air)            Physical Exam  Constitutional:       Appearance: Normal appearance.   Musculoskeletal:         General: Swelling and  tenderness (in area of lateral malleolus) present.      Comments: Left ankle  Negative jennings's test  ROM intact  CMS intact       Neurological:      Mental Status: She is alert.   Psychiatric:         Mood and Affect: Mood normal.               ED Course   Labs Reviewed - No data to display  XR ANKLE (MIN 3 VIEWS), LEFT (CPT=73610)          PROCEDURE:  XR ANKLE (MIN 3 VIEWS), LEFT (CPT=73610)     TECHNIQUE:  Three views were obtained.     COMPARISON:  None.     INDICATIONS:  Lt foot injury     PATIENT STATED HISTORY: (As transcribed by Technologist)  Came down on left foot yesterday while playing ball.   Increased pain w weight bearing.Left lateral ankle swelling.         FINDINGS:  No acute fracture or dislocation.  Joint spaces and bony alignment are maintained.  Ankle mortise is maintained.  There is lateral soft tissue swelling.                   =====  CONCLUSION:  No acute osseous findings.  Lateral soft tissue swelling.        LOCATION:  [facility]        Dictated by (CST): Bucky Roth MD on 5/11/2024 at 11:18 AM       Finalized by (CST): Bucky Roth MD on 5/11/2024 at 11:19 AM                   MDM            Medical Decision Making  29 yo female presents with left ankle pain and swelling that began 1 day ago after rolling her ankle while playing ball with son outside.   Diff dx fracture vs sprain vs achilles tear  On exam, pt is well appearing, normal vitals.   Xray does not reveal fracture. Likely sprain.   Ankle stirrup placed and crutches provided.   Rx for Cam boot given to pt.   Referral to Ortho for follow up.   Return precautions discussed.         Amount and/or Complexity of Data Reviewed  Radiology: ordered.    Risk  OTC drugs.        Disposition and Plan     Clinical Impression:  1. Acute left ankle pain    2. Mild sprain of left ankle, initial encounter         Disposition:  Discharge  5/11/2024 11:29 am    Follow-up:  [facility] [address]  [address]  [phone]        Cesar Vargas, PA  [facility] [place] [id number]  [phone]    Schedule an appointment as soon as possible for a visit in 2 days            Medications Prescribed:  Current Discharge Medication List        START taking these medications    Details   Elastic Bandages & Supports (ACE ANKLE BRACE) Does not apply Misc Please provide patient with a CAM/walking boot.  Dx: L ankle sprain  Qty: 1 each, Refills: 0

## 2024-05-11 NOTE — DISCHARGE INSTRUCTIONS
Rest, Ice, Compress, elevate.   Ibuprofen as directed every 6 hours if needed for pain.   Contact Ortho office of [facility] (attached for follow up)  A script for cam boot was provided. [facility] sells CAM boots.

## 2024-05-11 NOTE — ED INITIAL ASSESSMENT (HPI)
Came down on left foot yesterday while playing ball.   Increased pain w weight bearing.  + swelling.  CMS intact.

## 2024-05-14 ENCOUNTER — OFFICE VISIT (OUTPATIENT)
Dept: ORTHOPEDICS CLINIC | Facility: CLINIC | Age: 29
End: 2024-05-14

## 2024-05-14 VITALS — BODY MASS INDEX: 29.99 KG/M2 | HEIGHT: 65 IN | WEIGHT: 180 LBS

## 2024-05-14 DIAGNOSIS — S93.492A SPRAIN OF ANTERIOR TALOFIBULAR LIGAMENT OF LEFT ANKLE, INITIAL ENCOUNTER: Primary | ICD-10-CM

## 2024-05-14 PROCEDURE — 99213 OFFICE O/P EST LOW 20 MIN: CPT | Performed by: PHYSICIAN ASSISTANT

## 2024-05-14 PROCEDURE — 3008F BODY MASS INDEX DOCD: CPT | Performed by: PHYSICIAN ASSISTANT

## 2024-05-14 NOTE — H&P
[facility] - ORTHOPEDICS  [address] [place] [id number]  [phone]     NEW PATIENT VISIT - HISTORY AND PHYSICAL EXAMINATION     Name: Kimberly Rodriguez   MRN: [id number]  Date: 5/14/2024     CC: Left ankle pain.     REFERRED BY: No primary care provider on file.    HPI:   Kimberly Rodriguez is a very pleasant 28 year old female who presents today for evaluation, consultation, and management of LEFT ANKLE injury after she twisted and rolled her ankle on 05/10/2024. She had significant pain, swelling, stiffness and went to the immediate care and was referred to our service for further evaluation and management. Pain is a 2/10. She works as a teacher.       PMH:   History reviewed. No pertinent past medical history.    PAST SURGICAL HX:  Past Surgical History:   Procedure Laterality Date    Adenoidectomy      Tonsillectomy         FAMILY HX:  Family History   Problem Relation Age of Onset    Arthritis Mother     Hypertension Mother     Obesity Mother     Depression Mother     Heart Disorder Father        ALLERGIES:  Patient has no known allergies.    MEDICATIONS:   Current Outpatient Medications   Medication Sig Dispense Refill    Elastic Bandages & Supports (ACE ANKLE BRACE) Does not apply Misc Please provide patient with a CAM/walking boot.  Dx: L ankle sprain 1 each 0    albuterol 108 (90 Base) MCG/ACT Inhalation Aero Soln Inhale 2 puffs into the lungs every 6 (six) hours as needed for Wheezing. 3 each 1    Norethin-Eth Estrad-Fe Biphas (LO LOESTRIN FE) 1 MG-10 MCG / 10 MCG Oral Tab Take 1 tablet by mouth daily. (Patient not taking: Reported on 5/29/2022) 84 tablet 2    albuterol 108 (90 Base) MCG/ACT Inhalation Aero Soln Inhale 2 puffs into the lungs every 6 (six) hours as needed for Wheezing. (Patient not taking: Reported on 5/29/2022) 1 each 2    Norethin-Eth Estrad-Fe Biphas (LO LOESTRIN FE) 1 MG-10 MCG / 10 MCG Oral Tab Take 1 tablet by mouth daily. (Patient not taking:  Reported on 5/29/2022) 84 tablet 1    tiZANidine 2 MG Oral Tab Take 1 tablet (2 mg total) by mouth nightly. (Patient not taking: Reported on 11/11/2023) 30 tablet 2       ROS: A comprehensive 14 point review of systems was performed and was negative aside from the aforementioned per history of present illness.    SOCIAL HX:  Social History     Occupational History    Not on file   Tobacco Use    Smoking status: Never    Smokeless tobacco: Never   Vaping Use    Vaping status: Never Used   Substance and Sexual Activity    Alcohol use: No     Alcohol/week: 0.0 standard drinks of alcohol    Drug use: No    Sexual activity: Yes     Birth control/protection: Pill       PE:   Vitals:    05/14/24 0814   Weight: 180 lb (81.6 kg)   Height: 5' 5\" (1.651 m)     Estimated body mass index is 29.95 kg/m² as calculated from the following:    Height as of this encounter: 5' 5\" (1.651 m).    Weight as of this encounter: 180 lb (81.6 kg).    Physical Exam  Constitutional:       Appearance: Normal appearance.   HENT:      Head: Normocephalic and atraumatic.   Eyes:      Extraocular Movements: Extraocular movements intact.   Neck:      Musculoskeletal: Normal range of motion and neck supple.   Cardiovascular:      Pulses: Normal pulses.   Pulmonary:      Effort: Pulmonary effort is normal. No respiratory distress.   Abdominal:      General: There is no distension.   Skin:     General: Skin is warm.      Capillary Refill: Capillary refill takes less than 2 seconds.      Findings: No bruising.   Neurological:      General: No focal deficit present.      Mental Status: Alert.   Psychiatric:         Mood and Affect: Mood normal.     Examination of the left foot/ankle demonstrates:     Skin is intact, warm and dry.     Inspection:   Atrophy: none    Effusion: moderate    Edema: moderate    Erythema: none    Hematoma: none      Palpation:   Anterior Talo-Fibular Ligament (ATFL): moderate    Fibular Ligament (PTFL): none    Calcaneo-Fibular  Ligament (CFL): none    Achilles Tendon: none    Peroneal Tendon: none    Posterior Tib Tendon: none    Talar dome: none    Metatarsal bones: none    Joint line tenderness: none  Crepitation: none     ROM:   Dorsiflexion: 10 degrees.  Plantarflexion: 40 degrees.  Eversion:  20 degrees  Inversion: 30 degrees.    Strength: normal     Special Tests:   Anterior Drawer Test ATFL Rupture: Negative  CFL Forced Inversion: Negative   Talar Dome:  Negative   Gait:  normal   Leg length: equal and symmetric  Alignment:  neutral     No obvious peripheral edema noted.   Distal neurovascular exam demonstrates normal perfusion, intact sensation to light touch and full strength.     Examination of the contralateral foot/ankle demonstrates:  No significant atrophy, swelling or effusion. Full range of motion. Neurovascularly intact distally.      Radiographic Examination/Diagnostics:  I personally viewed, independently interpreted and radiology report was reviewed.      XR ANKLE (MIN 3 VIEWS), LEFT (CPT=73610)    Result Date: 5/11/2024  PROCEDURE:  XR ANKLE (MIN 3 VIEWS), LEFT (CPT=73610)  TECHNIQUE:  Three views were obtained.  COMPARISON:  None.  INDICATIONS:  Lt foot injury  PATIENT STATED HISTORY: (As transcribed by Technologist)  Came down on left foot yesterday while playing ball. Increased pain w weight bearing.Left lateral ankle swelling.    FINDINGS:  No acute fracture or dislocation.  Joint spaces and bony alignment are maintained.  Ankle mortise is maintained.  There is lateral soft tissue swelling.            CONCLUSION:  No acute osseous findings.  Lateral soft tissue swelling.   LOCATION:  [facility]   Dictated by (CST): Bucky Roth MD on 5/11/2024 at 11:18 AM     Finalized by (CST): Bucky Roth MD on 5/11/2024 at 11:19 AM         IMPRESSION: Kimberly Rodriguez is a 28 year old female who presents with left ankle sprain.     PLAN:   We had a detailed discussion outlining the etiology, anatomy, pathophysiology, and  natural history of the patient's findings. Imaging was reviewed in detail and correlated to a 3-dimensional model of the patient's pathology.     We reviewed the treatment of this disease condition.  Fortunately, treatment is amenable to conservative treatment which we chose to optimize at today's visit.      We recommended physical therapy to aid in strengthening, range of motion, functional improvement, and return to baseline activity.  The patient had the opportunity to ask questions and all questions were answered appropriately.      FOLLOW-UP:  8 weeks or as needed.             Cesar Vargas [facility], PA-C Orthopedic Surgery / Sports Medicine Specialist  [facility]  [address] [place] [address].org  [email or web address]  t: [phone]  o: [phone]  f: [phone]    This note was dictated using Dragon software.  While it was briefly proofread prior to completion, some grammatical, spelling, and word choice errors due to dictation may still occur.

## 2024-06-04 NOTE — ASSESSMENT & PLAN NOTE
Patient with a history of mild intermittent asthma on exposure to allergens and animals.   Over the past 4 weeks when the weather turned cold she has had increasing wheezing and has had to use her inhalers about 4-5 times a day and continues to have wheezin
6/1/24

## 2024-11-05 LAB
HEPATITIS B SURFACE ANTIGEN OB: NEGATIVE
HIV RESULT OB: NEGATIVE
RH FACTOR OB: POSITIVE

## 2024-12-02 LAB — AMB EXT GLUCOSE 1HR OB: 57

## 2025-03-25 LAB
HIV RESULT OB: NEGATIVE
RAPID PLASMA REAGIN OB: NONREACTIVE

## 2025-05-18 LAB — STREP GP B CULT OB: POSITIVE

## 2025-06-03 ENCOUNTER — TELEPHONE (OUTPATIENT)
Dept: OBGYN UNIT | Facility: HOSPITAL | Age: 30
End: 2025-06-03

## 2025-06-09 ENCOUNTER — TELEPHONE (OUTPATIENT)
Dept: OBGYN UNIT | Facility: HOSPITAL | Age: 30
End: 2025-06-09

## 2025-06-09 RX ORDER — CHOLECALCIFEROL (VITAMIN D3) 25 MCG
1 TABLET,CHEWABLE ORAL DAILY
COMMUNITY

## 2025-06-09 RX ORDER — ASPIRIN 81 MG/1
121.5 TABLET ORAL DAILY
COMMUNITY

## 2025-06-12 ENCOUNTER — APPOINTMENT (OUTPATIENT)
Dept: OBGYN CLINIC | Facility: HOSPITAL | Age: 30
End: 2025-06-12
Payer: COMMERCIAL

## 2025-06-12 ENCOUNTER — ANESTHESIA (OUTPATIENT)
Dept: OBGYN CLINIC | Facility: HOSPITAL | Age: 30
End: 2025-06-12
Payer: COMMERCIAL

## 2025-06-12 ENCOUNTER — HOSPITAL ENCOUNTER (INPATIENT)
Facility: HOSPITAL | Age: 30
LOS: 3 days | Discharge: HOME | End: 2025-06-15
Attending: OBSTETRICS & GYNECOLOGY | Admitting: OBSTETRICS & GYNECOLOGY
Payer: COMMERCIAL

## 2025-06-12 PROBLEM — Z34.90 PREGNANCY (HCC): Status: ACTIVE | Noted: 2025-06-12

## 2025-06-12 LAB
ANTIBODY SCREEN: NEGATIVE
BASOPHILS # BLD AUTO: 0.04 X10(3) UL (ref 0–0.2)
BASOPHILS NFR BLD AUTO: 0.4 %
EOSINOPHIL # BLD AUTO: 0.14 X10(3) UL (ref 0–0.7)
EOSINOPHIL NFR BLD AUTO: 1.5 %
ERYTHROCYTE [DISTWIDTH] IN BLOOD BY AUTOMATED COUNT: 12.7 %
HCT VFR BLD AUTO: 33.2 % (ref 35–48)
HGB BLD-MCNC: 11.6 G/DL (ref 12–16)
IMM GRANULOCYTES # BLD AUTO: 0.03 X10(3) UL (ref 0–1)
IMM GRANULOCYTES NFR BLD: 0.3 %
LYMPHOCYTES # BLD AUTO: 2.04 X10(3) UL (ref 1–4)
LYMPHOCYTES NFR BLD AUTO: 21.6 %
MCH RBC QN AUTO: 30 PG (ref 26–34)
MCHC RBC AUTO-ENTMCNC: 34.9 G/DL (ref 31–37)
MCV RBC AUTO: 85.8 FL (ref 80–100)
MONOCYTES # BLD AUTO: 0.74 X10(3) UL (ref 0.1–1)
MONOCYTES NFR BLD AUTO: 7.8 %
NEUTROPHILS # BLD AUTO: 6.46 X10 (3) UL (ref 1.5–7.7)
NEUTROPHILS # BLD AUTO: 6.46 X10(3) UL (ref 1.5–7.7)
NEUTROPHILS NFR BLD AUTO: 68.4 %
PLATELET # BLD AUTO: 177 10(3)UL (ref 150–450)
RBC # BLD AUTO: 3.87 X10(6)UL (ref 3.8–5.3)
RH BLOOD TYPE: POSITIVE
RH BLOOD TYPE: POSITIVE
T PALLIDUM AB SER QL IA: NONREACTIVE
WBC # BLD AUTO: 9.5 X10(3) UL (ref 4–11)

## 2025-06-12 PROCEDURE — 86850 RBC ANTIBODY SCREEN: CPT | Performed by: OBSTETRICS & GYNECOLOGY

## 2025-06-12 PROCEDURE — 86901 BLOOD TYPING SEROLOGIC RH(D): CPT | Performed by: OBSTETRICS & GYNECOLOGY

## 2025-06-12 PROCEDURE — 85025 COMPLETE CBC W/AUTO DIFF WBC: CPT | Performed by: OBSTETRICS & GYNECOLOGY

## 2025-06-12 PROCEDURE — 3E0DXGC INTRODUCTION OF OTHER THERAPEUTIC SUBSTANCE INTO MOUTH AND PHARYNX, EXTERNAL APPROACH: ICD-10-PCS | Performed by: OBSTETRICS & GYNECOLOGY

## 2025-06-12 PROCEDURE — 86780 TREPONEMA PALLIDUM: CPT | Performed by: OBSTETRICS & GYNECOLOGY

## 2025-06-12 PROCEDURE — 86900 BLOOD TYPING SEROLOGIC ABO: CPT | Performed by: OBSTETRICS & GYNECOLOGY

## 2025-06-12 PROCEDURE — 3E033VJ INTRODUCTION OF OTHER HORMONE INTO PERIPHERAL VEIN, PERCUTANEOUS APPROACH: ICD-10-PCS | Performed by: OBSTETRICS & GYNECOLOGY

## 2025-06-12 RX ORDER — SODIUM CHLORIDE, SODIUM LACTATE, POTASSIUM CHLORIDE, CALCIUM CHLORIDE 600; 310; 30; 20 MG/100ML; MG/100ML; MG/100ML; MG/100ML
INJECTION, SOLUTION INTRAVENOUS CONTINUOUS
Status: DISCONTINUED | OUTPATIENT
Start: 2025-06-12 | End: 2025-06-13

## 2025-06-12 RX ORDER — ROPIVACAINE HYDROCHLORIDE 5 MG/ML
30 INJECTION, SOLUTION EPIDURAL; INFILTRATION; PERINEURAL AS NEEDED
Status: DISCONTINUED | OUTPATIENT
Start: 2025-06-12 | End: 2025-06-13

## 2025-06-12 RX ORDER — FERROUS SULFATE 325(65) MG
325 TABLET, DELAYED RELEASE (ENTERIC COATED) ORAL
COMMUNITY

## 2025-06-12 RX ORDER — TERBUTALINE SULFATE 1 MG/ML
0.25 INJECTION SUBCUTANEOUS AS NEEDED
Status: COMPLETED | OUTPATIENT
Start: 2025-06-12 | End: 2025-06-13

## 2025-06-12 RX ORDER — ACETAMINOPHEN 500 MG
500 TABLET ORAL EVERY 6 HOURS PRN
Status: DISCONTINUED | OUTPATIENT
Start: 2025-06-12 | End: 2025-06-13

## 2025-06-12 RX ORDER — DEXTROSE, SODIUM CHLORIDE, SODIUM LACTATE, POTASSIUM CHLORIDE, AND CALCIUM CHLORIDE 5; .6; .31; .03; .02 G/100ML; G/100ML; G/100ML; G/100ML; G/100ML
INJECTION, SOLUTION INTRAVENOUS AS NEEDED
Status: DISCONTINUED | OUTPATIENT
Start: 2025-06-12 | End: 2025-06-13

## 2025-06-12 RX ORDER — ONDANSETRON 2 MG/ML
4 INJECTION INTRAMUSCULAR; INTRAVENOUS EVERY 6 HOURS PRN
Status: DISCONTINUED | OUTPATIENT
Start: 2025-06-12 | End: 2025-06-13

## 2025-06-12 RX ORDER — CALCIUM CARBONATE 500 MG/1
1000 TABLET, CHEWABLE ORAL EVERY 4 HOURS PRN
Status: DISCONTINUED | OUTPATIENT
Start: 2025-06-12 | End: 2025-06-13

## 2025-06-12 RX ORDER — CITRIC ACID/SODIUM CITRATE 334-500MG
30 SOLUTION, ORAL ORAL AS NEEDED
Status: DISCONTINUED | OUTPATIENT
Start: 2025-06-12 | End: 2025-06-13

## 2025-06-12 RX ORDER — ACETAMINOPHEN 500 MG
1000 TABLET ORAL EVERY 6 HOURS PRN
Status: DISCONTINUED | OUTPATIENT
Start: 2025-06-12 | End: 2025-06-13

## 2025-06-12 NOTE — PROGRESS NOTES
Pt is a 29 year old female admitted to -A.  Pt is  40w0d intra-uterine pregnancy.    Chief Complaint   Patient presents with    Scheduled Induction     Elective   Pt denies any complications w/ current pregnancy, denies any pain, uterine activity, leaking of fluid or vaginal bleeding and is aware of fetal movements. EFM explained and applied, abdomen palpating soft, non-tender.      History obtained, oriented to room, staff, and plan of care. Pt's significant other @ [facility].

## 2025-06-12 NOTE — PLAN OF CARE
Problem: Patient/Family Goals  Goal: Patient/Family Long Term Goal  Description: Patient's Long Term Goal: uncomplicated vaginal delivery    Interventions:  - See additional Care Plan goals for specific interventions  Outcome: Progressing  Goal: Patient/Family Short Term Goal  Description: Patient's Short Term Goal: effective pain and anxiety management    Interventions:   - See additional Care Plan goals for specific interventions  Outcome: Progressing     Problem: BIRTH - VAGINAL/ SECTION  Goal: Fetal and maternal status remain reassuring during the birth process  Description: INTERVENTIONS:  - Monitor vital signs  - Monitor fetal heart rate  - Monitor uterine activity  - Monitor labor progression (vaginal delivery)  - DVT prophylaxis (C/S delivery)  - Surgical antibiotic prophylaxis (C/S delivery)  Outcome: Progressing     Problem: PAIN - ADULT  Goal: Verbalizes/displays adequate comfort level or patient's stated pain goal  Description: INTERVENTIONS:  - Encourage pt to monitor pain and request assistance  - Assess pain using appropriate pain scale  - Administer analgesics based on type and severity of pain and evaluate response  - Implement non-pharmacological measures as appropriate and evaluate response  - Consider cultural and social influences on pain and pain management  - Manage/alleviate anxiety  - Utilize distraction and/or relaxation techniques  - Monitor for opioid side effects  - Notify MD/LIP if interventions unsuccessful or patient reports new pain  - Anticipate increased pain with activity and pre-medicate as appropriate  Outcome: Progressing     Problem: ANXIETY  Goal: Will report anxiety at manageable levels  Description: INTERVENTIONS:  - Administer medication as ordered  - Teach and rehearse alternative coping skills  - Provide emotional support with 1:1 interaction with staff  Outcome: Progressing

## 2025-06-13 ENCOUNTER — ANESTHESIA EVENT (OUTPATIENT)
Dept: OBGYN UNIT | Facility: HOSPITAL | Age: 30
End: 2025-06-13
Payer: COMMERCIAL

## 2025-06-13 ENCOUNTER — ANESTHESIA (OUTPATIENT)
Dept: OBGYN UNIT | Facility: HOSPITAL | Age: 30
End: 2025-06-13
Payer: COMMERCIAL

## 2025-06-13 PROCEDURE — 10907ZC DRAINAGE OF AMNIOTIC FLUID, THERAPEUTIC FROM PRODUCTS OF CONCEPTION, VIA NATURAL OR ARTIFICIAL OPENING: ICD-10-PCS | Performed by: OBSTETRICS & GYNECOLOGY

## 2025-06-13 PROCEDURE — 0KQM0ZZ REPAIR PERINEUM MUSCLE, OPEN APPROACH: ICD-10-PCS | Performed by: OBSTETRICS & GYNECOLOGY

## 2025-06-13 RX ORDER — CLINDAMYCIN PHOSPHATE 900 MG/50ML
INJECTION, SOLUTION INTRAVENOUS
Status: COMPLETED
Start: 2025-06-13 | End: 2025-06-13

## 2025-06-13 RX ORDER — SODIUM CHLORIDE 9 MG/ML
10 INJECTION, SOLUTION INTRAMUSCULAR; INTRAVENOUS; SUBCUTANEOUS AS NEEDED
Status: DISCONTINUED | OUTPATIENT
Start: 2025-06-13 | End: 2025-06-13

## 2025-06-13 RX ORDER — ACETAMINOPHEN 500 MG
500 TABLET ORAL EVERY 6 HOURS PRN
Status: DISCONTINUED | OUTPATIENT
Start: 2025-06-13 | End: 2025-06-15

## 2025-06-13 RX ORDER — HYDROMORPHONE HYDROCHLORIDE 1 MG/ML
1 INJECTION, SOLUTION INTRAMUSCULAR; INTRAVENOUS; SUBCUTANEOUS ONCE
Refills: 0 | Status: COMPLETED | OUTPATIENT
Start: 2025-06-13 | End: 2025-06-13

## 2025-06-13 RX ORDER — LIDOCAINE HYDROCHLORIDE 20 MG/ML
5 INJECTION, SOLUTION EPIDURAL; INFILTRATION; INTRACAUDAL; PERINEURAL AS NEEDED
Status: DISCONTINUED | OUTPATIENT
Start: 2025-06-13 | End: 2025-06-13

## 2025-06-13 RX ORDER — LIDOCAINE HYDROCHLORIDE AND EPINEPHRINE 15; 5 MG/ML; UG/ML
5 INJECTION, SOLUTION EPIDURAL AS NEEDED
Status: DISCONTINUED | OUTPATIENT
Start: 2025-06-13 | End: 2025-06-13

## 2025-06-13 RX ORDER — BUPIVACAINE HYDROCHLORIDE 2.5 MG/ML
INJECTION, SOLUTION EPIDURAL; INFILTRATION; INTRACAUDAL; PERINEURAL AS NEEDED
Status: DISCONTINUED | OUTPATIENT
Start: 2025-06-13 | End: 2025-06-13 | Stop reason: SURG

## 2025-06-13 RX ORDER — AMMONIA 15 % (W/V)
0.3 AMPUL (EA) INHALATION AS NEEDED
Status: DISCONTINUED | OUTPATIENT
Start: 2025-06-13 | End: 2025-06-15

## 2025-06-13 RX ORDER — BUPIVACAINE HYDROCHLORIDE 2.5 MG/ML
30 INJECTION, SOLUTION EPIDURAL; INFILTRATION; INTRACAUDAL; PERINEURAL AS NEEDED
Status: DISCONTINUED | OUTPATIENT
Start: 2025-06-13 | End: 2025-06-13

## 2025-06-13 RX ORDER — CLINDAMYCIN PHOSPHATE 600 MG/50ML
600 INJECTION, SOLUTION INTRAVENOUS EVERY 8 HOURS
Status: DISCONTINUED | OUTPATIENT
Start: 2025-06-13 | End: 2025-06-13

## 2025-06-13 RX ORDER — BISACODYL 10 MG
10 SUPPOSITORY, RECTAL RECTAL ONCE AS NEEDED
Status: DISCONTINUED | OUTPATIENT
Start: 2025-06-13 | End: 2025-06-15

## 2025-06-13 RX ORDER — NALBUPHINE HYDROCHLORIDE 10 MG/ML
2.5 INJECTION INTRAMUSCULAR; INTRAVENOUS; SUBCUTANEOUS
Status: DISCONTINUED | OUTPATIENT
Start: 2025-06-13 | End: 2025-06-13

## 2025-06-13 RX ORDER — SIMETHICONE 80 MG
80 TABLET,CHEWABLE ORAL 3 TIMES DAILY PRN
Status: DISCONTINUED | OUTPATIENT
Start: 2025-06-13 | End: 2025-06-15

## 2025-06-13 RX ORDER — ACETAMINOPHEN 10 MG/ML
1000 INJECTION, SOLUTION INTRAVENOUS ONCE
Status: COMPLETED | OUTPATIENT
Start: 2025-06-13 | End: 2025-06-13

## 2025-06-13 RX ORDER — IBUPROFEN 600 MG/1
600 TABLET, FILM COATED ORAL EVERY 6 HOURS
Status: DISCONTINUED | OUTPATIENT
Start: 2025-06-13 | End: 2025-06-15

## 2025-06-13 RX ORDER — LIDOCAINE HYDROCHLORIDE AND EPINEPHRINE 15; 5 MG/ML; UG/ML
INJECTION, SOLUTION EPIDURAL AS NEEDED
Status: DISCONTINUED | OUTPATIENT
Start: 2025-06-13 | End: 2025-06-13 | Stop reason: SURG

## 2025-06-13 RX ORDER — TRANEXAMIC ACID 10 MG/ML
INJECTION, SOLUTION INTRAVENOUS
Status: COMPLETED
Start: 2025-06-13 | End: 2025-06-13

## 2025-06-13 RX ORDER — METHYLERGONOVINE MALEATE 0.2 MG/ML
INJECTION INTRAVENOUS
Status: COMPLETED
Start: 2025-06-13 | End: 2025-06-13

## 2025-06-13 RX ORDER — HYDROMORPHONE HYDROCHLORIDE 1 MG/ML
INJECTION, SOLUTION INTRAMUSCULAR; INTRAVENOUS; SUBCUTANEOUS
Status: COMPLETED
Start: 2025-06-13 | End: 2025-06-13

## 2025-06-13 RX ORDER — HYDROMORPHONE HYDROCHLORIDE 1 MG/ML
1 INJECTION, SOLUTION INTRAMUSCULAR; INTRAVENOUS; SUBCUTANEOUS ONCE AS NEEDED
Refills: 0 | Status: COMPLETED | OUTPATIENT
Start: 2025-06-13 | End: 2025-06-13

## 2025-06-13 RX ORDER — ACETAMINOPHEN 500 MG
1000 TABLET ORAL EVERY 6 HOURS PRN
Status: DISCONTINUED | OUTPATIENT
Start: 2025-06-13 | End: 2025-06-15

## 2025-06-13 RX ORDER — BUPIVACAINE HCL/0.9 % NACL/PF 0.25 %
5 PLASTIC BAG, INJECTION (ML) EPIDURAL AS NEEDED
Status: DISCONTINUED | OUTPATIENT
Start: 2025-06-13 | End: 2025-06-13

## 2025-06-13 RX ORDER — DOCUSATE SODIUM 100 MG/1
100 CAPSULE, LIQUID FILLED ORAL
Status: DISCONTINUED | OUTPATIENT
Start: 2025-06-13 | End: 2025-06-15

## 2025-06-13 RX ADMIN — LIDOCAINE HYDROCHLORIDE AND EPINEPHRINE 3 ML: 15; 5 INJECTION, SOLUTION EPIDURAL at 10:51:00

## 2025-06-13 RX ADMIN — BUPIVACAINE HYDROCHLORIDE 5 ML: 2.5 INJECTION, SOLUTION EPIDURAL; INFILTRATION; INTRACAUDAL; PERINEURAL at 10:54:00

## 2025-06-13 NOTE — PROGRESS NOTES
Bedside report received from Rima OZUNA. Care assumed. Pt resting in bed after receiving dilaudid IVP at 7am.

## 2025-06-13 NOTE — PROGRESS NOTES
Patient examined and was noted to be 1/70/-2 cephalic, cervix still posterior.  She had 6 doses of cytotec and pitocin started last night. AROM performed with clear fluid. Fetal status is reassuring. Pain  management plan discussed with her.  Will continue careful observation and increase pitocin as needed.

## 2025-06-13 NOTE — PROGRESS NOTES
Late entry summary for labor events from [id number]. RN at bedside for decel, pit turned off at 1227,LR fluid bolus started, position changes from left and right, SVE and FSE placed by RN at 1232 and patient repositioned in hands/knees. Dr De La Cruz called at 1236 and at bedside by 1238 at resolution of prolonged decel. Terbutaline 0.25mg subcut ordered by Dr De La Cruz and given in pt right arm by GEORGIA Holder RN.   Dr De La Cruz and RN remained at bedside through resolution of prolonged decel with recovery of FHT to 140's. Patient and family reassured, questions answered. Patient and fetus remain stable, expectant management continued.

## 2025-06-13 NOTE — ANESTHESIA PREPROCEDURE EVALUATION
PRE-OP EVALUATION    Patient Name: Kimberly Rodriguez    Admit Diagnosis: pregnanacy  Pregnancy (HCC)    Pre-op Diagnosis: * No pre-op diagnosis entered *        Anesthesia Procedure: LABOR ANALGESIA    * No surgeons found in log *    Pre-op vitals reviewed.  Temp: 97.5 °F (36.4 °C)  Pulse: 67  Resp: 14  BP: 132/76  SpO2: 97 %  Body mass index is 34.28 kg/m².    Current medications reviewed.  Hospital Medications:  Current Medications[1]    Outpatient Medications:   Prescriptions Prior to Admission[2]    Allergies: Patient has no known allergies.      Anesthesia Evaluation        Anesthetic Complications           GI/Hepatic/Renal                                 Cardiovascular                                                       Endo/Other                                  Pulmonary      (+) asthma                     Neuro/Psych                              G1        Past Surgical History[3]  Social Hx on file[4]  History   Drug Use No     Available pre-op labs reviewed.  Lab Results   Component Value Date    WBC 9.5 06/12/2025    RBC 3.87 06/12/2025    HGB 11.6 (L) 06/12/2025    HCT 33.2 (L) 06/12/2025    MCV 85.8 06/12/2025    MCH 30.0 06/12/2025    MCHC 34.9 06/12/2025    RDW 12.7 06/12/2025    .0 06/12/2025               Airway      Mallampati: II  Mouth opening: 3 FB  TM distance: 4 - 6 cm  Neck ROM: full Cardiovascular      Rhythm: regular  Rate: normal     Dental    Dentition appears grossly intact         Pulmonary      Breath sounds clear to auscultation bilaterally.               Other findings              ASA: 2   Plan: epidural           Comment: Risks of labor epidural including headache, bleeding, infection, nerve injury, discussed with patient.  Patient understands risks and wishes to proceed.          Plan/risks discussed with: patient                Present on Admission:  **None**             [1]    [COMPLETED] HYDROmorphone (Dilaudid) 1 MG/ML injection 1 mg  1 mg Intravenous Once PRN     tranexamic acid in sodium chloride 0.7% (Cyklokapron) 1000 mg/100mL infusion premix        [COMPLETED] HYDROmorphone (Dilaudid) 1 MG/ML injection 1 mg  1 mg Intravenous Once    lactated ringers IV bolus 1,000 mL  1,000 mL Intravenous Once    fentaNYL-bupivacaine 2 mcg/mL-0.125% in sodium chloride 0.9% 100 mL EPIDURAL infusion premix  12 mL/hr Epidural Continuous    fentaNYL (Sublimaze) 50 mcg/mL injection 100 mcg  100 mcg Epidural Once    lidocaine 1.5%-EPINEPHrine 1:200,000 (Xylocaine-Epinephrine) injection  5 mL Injection PRN    bupivacaine PF (Marcaine) 0.25% injection  30 mL Injection PRN    lidocaine PF (Xylocaine-MPF) 2% injection  5 mL Injection PRN    sodium chloride 0.9% PF injection 10 mL  10 mL Injection PRN    ePHEDrine (PF) 25 MG/5 ML injection 5 mg  5 mg Intravenous PRN    nalbuphine (Nubain) 10 mg/mL injection 2.5 mg  2.5 mg Intravenous Q15 Min PRN    lactated ringers infusion   Intravenous Continuous    dextrose in lactated ringers 5% infusion   Intravenous PRN    lactated ringers IV bolus 500 mL  500 mL Intravenous PRN    acetaminophen (Tylenol Extra Strength) tab 500 mg  500 mg Oral Q6H PRN    acetaminophen (Tylenol Extra Strength) tab 1,000 mg  1,000 mg Oral Q6H PRN    ondansetron (Zofran) 4 MG/2ML injection 4 mg  4 mg Intravenous Q6H PRN    oxyTOCIN in sodium chloride 0.9% (Pitocin) 30 Units/500mL infusion premix  62.5-900 lani-units/min Intravenous Continuous    terbutaline (Brethine) 1 MG/ML injection 0.25 mg  0.25 mg Subcutaneous PRN    sodium citrate-citric acid (Bicitra) 500-334 MG/5ML oral solution 30 mL  30 mL Oral PRN    ropivacaine (Naropin) 0.5% injection  30 mL Injection PRN    calcium carbonate (Tums) chewable tab 1,000 mg  1,000 mg Oral Q4H PRN    [COMPLETED] ampicillin (Omnipen) 2 g in sodium chloride 0.9% 100 mL IVPB-ANDERS  2 g Intravenous Once    Followed by    ampicillin (Omnipen) 1 g in sodium chloride 0.9% 100mL IVPB-ANDERS  1 g Intravenous Q4H    [] misoprostol (CYTOTEC)  partial tablets 25 mcg  25 mcg Oral Q2H    oxyTOCIN in sodium chloride 0.9% (Pitocin) 30 Units/500mL infusion premix  0.5-20 lani-units/min Intravenous Continuous   [2]   Medications Prior to Admission   Medication Sig Dispense Refill Last Dose/Taking    ferrous sulfate 325 (65 FE) MG Oral Tab EC Take 1 tablet (325 mg total) by mouth daily with breakfast.   6/11/2025 at  8:00 AM    prenatal vitamin with DHA 27-0.8-228 MG Oral Cap Take 1 capsule by mouth daily.   6/11/2025 at  9:00 PM    aspirin 81 MG Oral Tab EC Take 121.5 mg by mouth daily.   6/11/2025 at  9:00 PM    Cholecalciferol (VITAMIN D3) 25 MCG (1000 UT) Oral Cap Take 1 tablet by mouth daily.   6/11/2025 at  9:00 PM    Elastic Bandages & Supports (ACE ANKLE BRACE) Does not apply Misc Please provide patient with a CAM/walking boot.  Dx: L ankle sprain 1 each 0     albuterol 108 (90 Base) MCG/ACT Inhalation Aero Soln Inhale 2 puffs into the lungs every 6 (six) hours as needed for Wheezing. 3 each 1     Norethin-Eth Estrad-Fe Biphas (LO LOESTRIN FE) 1 MG-10 MCG / 10 MCG Oral Tab Take 1 tablet by mouth daily. (Patient not taking: Reported on 5/29/2022) 84 tablet 2     albuterol 108 (90 Base) MCG/ACT Inhalation Aero Soln Inhale 2 puffs into the lungs every 6 (six) hours as needed for Wheezing. (Patient not taking: Reported on 5/29/2022) 1 each 2     Norethin-Eth Estrad-Fe Biphas (LO LOESTRIN FE) 1 MG-10 MCG / 10 MCG Oral Tab Take 1 tablet by mouth daily. (Patient not taking: Reported on 5/29/2022) 84 tablet 1     tiZANidine 2 MG Oral Tab Take 1 tablet (2 mg total) by mouth nightly. (Patient not taking: Reported on 11/11/2023) 30 tablet 2    [3]   Past Surgical History:  Procedure Laterality Date    Adenoidectomy      Tonsillectomy     [4]   Social History  Socioeconomic History    Marital status:    Tobacco Use    Smoking status: Never    Smokeless tobacco: Never   Vaping Use    Vaping status: Never Used   Substance and Sexual Activity    Alcohol use: No      Alcohol/week: 0.0 standard drinks of alcohol    Drug use: No    Sexual activity: Yes     Birth control/protection: Pill   Other Topics Concern    Pt has a pacemaker No    Reaction to local anesthetic No

## 2025-06-13 NOTE — PLAN OF CARE
Problem: Patient/Family Goals  Goal: Patient/Family Long Term Goal  Description: Patient's Long Term Goal: uncomplicated vaginal delivery    Interventions:  - See additional Care Plan goals for specific interventions  2025 by Katiuska Taylor RN  Outcome: Progressing  2025 by Katiuska Taylor RN  Outcome: Progressing  Goal: Patient/Family Short Term Goal  Description: Patient's Short Term Goal: effective pain and anxiety management    Interventions:   - See additional Care Plan goals for specific interventions  2025 by Katiuska Taylor RN  Outcome: Progressing  2025 by Katiuska Taylor RN  Outcome: Progressing     Problem: BIRTH - VAGINAL/ SECTION  Goal: Fetal and maternal status remain reassuring during the birth process  Description: INTERVENTIONS:  - Monitor vital signs  - Monitor fetal heart rate  - Monitor uterine activity  - Monitor labor progression (vaginal delivery)  - DVT prophylaxis (C/S delivery)  - Surgical antibiotic prophylaxis (C/S delivery)  2025 by Katiuska Taylor RN  Outcome: Progressing  2025 by Katiuska Taylor RN  Outcome: Progressing     Problem: PAIN - ADULT  Goal: Verbalizes/displays adequate comfort level or patient's stated pain goal  Description: INTERVENTIONS:  - Encourage pt to monitor pain and request assistance  - Assess pain using appropriate pain scale  - Administer analgesics based on type and severity of pain and evaluate response  - Implement non-pharmacological measures as appropriate and evaluate response  - Consider cultural and social influences on pain and pain management  - Manage/alleviate anxiety  - Utilize distraction and/or relaxation techniques  - Monitor for opioid side effects  - Notify MD/LIP if interventions unsuccessful or patient reports new pain  - Anticipate increased pain with activity and pre-medicate as appropriate  2025 by Katiuska Taylor RN  Outcome: Progressing  2025 by  Katiuska Taylor, RN  Outcome: Progressing     Problem: ANXIETY  Goal: Will report anxiety at manageable levels  Description: INTERVENTIONS:  - Administer medication as ordered  - Teach and rehearse alternative coping skills  - Provide emotional support with 1:1 interaction with staff  6/12/2025 1946 by Katiuska Taylor, RN  Outcome: Progressing  6/12/2025 1945 by Katiuska Taylor, RN  Outcome: Progressing

## 2025-06-13 NOTE — PROGRESS NOTES
[facility]acy Dosing Service     Initial Pharmacokinetic Consult for Aminoglycoside Dosing     Kimberly Rodriguez is a 29 year old patient who is being initiated on aminoglycoside therapy for rule out chorioamnionitis. Pharmacy has been asked to dose gentamicin by Dr De La Cruz. The dosing approach will be treatment, and the initial strategy will be extended interval dosing.    Vitals:    Actual Body Weight:   Wt Readings from Last 1 Encounters:   25 93.4 kg (206 lb)     Ideal body weight: 57 kg (125 lb 10.6 oz)  Adjusted ideal body weight: 71.6 kg (157 lb 12.8 oz)    Temp Readings from Last 1 Encounters:   25 99.6 °F (37.6 °C) (Oral)        Labs:   No results for input(s): \"CREATSERUM\" in the last 168 hours.   CrCl cannot be calculated (Patient's most recent lab result is older than the maximum 7 days allowed.).   Recent Labs   Lab 25  0753   WBC 9.5        Renal Dosing Considerations:    None     Assessment/Plan:   Empiric dosing weight: actual body weight (ABW)    Initial dose: 460 mg (5 mg/kg, capped at 520 mg) IV every 24 hours    Monitorin) Plan for aminoglycoside first & second levels (extended interval dosing) after 3rd gentamicin dose if continued that long.    2) Goal levels are as follows: N/A first & second levels used to calculate dosing interval    3) Pharmacy will order SCr as clinically indicated to assess renal function.    4) Pharmacy will monitor for toxicity and efficacy, adjust aminoglycoside dose and/or frequency, and order levels as appropriate per the Pharmacy and Therapeutics Committee approved protocol until discontinuation of the medication.       We appreciate the opportunity to assist in the care of this patient     Marybeth Riley PharmD  2025  5:51 PM  [facility]: [phone]

## 2025-06-13 NOTE — H&P
[facility]    PATIENT'S NAME: SHIRA CHAIREZ   ATTENDING PHYSICIAN: Sheba Nava M.D.   PATIENT ACCOUNT#:   [id number]    LOCATION:  [address] [place] [address]  MEDICAL RECORD #:   [id number]       YOB: 1995  ADMISSION DATE:       2025    HISTORY AND PHYSICAL EXAMINATION    HISTORY OF PRESENT ILLNESS:  She is a 29-year-old female.  She is a  1, para 0, with a due date of , coming in for induction of labor.  She came in early this morning for Cytotec.  Her cervix on admission was fingertip at 20%, -2, very posterior.  Cytotec was initiated this morning.  Of note is that her prenatal care with our practice has been good.  Her labs are as follows.  She has a following lab work.  On 2024, she had blood type AB positive.  Antibody screen negative.  Hemoglobin was 12.8 initially, went down to 11 on , and then  was 11.5.  Her platelets initially on  were normal at 264 and then on most recent count, they were 205.  Her ferritin initially was 17 on admission of 2024 and then went up to 20.80.  Her hepatitis B surface antigen was negative.  On , her RPR was normal, negative.  She had HIV testing done then was negative.  Rubella immune.  GC, chlamydia negative.  Her Pap smear was done on  and was normal, negative.  Going on further, she had the following labs done.  She had a 1-hour Glucola done on  that was normal.  She had another 1-hour Glucola done  that was within normal limits.  Her hemoglobin A1c initially was 5.1.  Vitamin D  was 33 and then her repeat  was 42.  Her candida and bacterial vaginosis and trichomas that was done on  was negative.  She had a group B streptococcus done in the latter portion of her pregnancy and it showed positive for group B streptococcus on May 18.  She will be treated in labor.  She did have a Panorama done with 22q deletion and an NT ultrasound  done and those were low risk.  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        PAST MEDICAL HISTORY:  She has a history of asthma.  She is on albuterol inhaler.      PAST SURGICAL HISTORY:  History of tonsillectomy previously.    MEDICATIONS:  Baby aspirin 81 mg, iron once orally a day, prenatal vitamin once a day, vitamin D 5000 internal units.    REPRODUCTIVE HISTORY:  Menarche at age 13, regular periods.  Pregnancy was conceived with intrauterine insemination.    ALLERGIES:  No known drug allergies.    PHYSICAL EXAMINATION:    GENERAL:  She is alert and oriented x3.  VITAL SIGNS:  She is 5-foot 5 inches tall, weighs 207 pounds.  Her BMI initially was 32.6.  Her blood pressures are 130s/70s.  Her strip is reactive.    LUNGS:  Nonlabored breathing.   ABDOMEN:  Gravid with estimated weight of about 7-1/2 to 8 pounds.  PELVIC:  By me, it is fingertip, 30%, and -2.  EXTREMITIES:  Minimal edema.  No evidence of DVT.    ASSESSMENT:    1.   Intrauterine pregnancy at 40 weeks.  2.   Induction of labor.  3.   Increased body mass index.  4.   History of asthma.  5.   History of anemia during pregnancy, on iron.  6.   Group B streptococcus positive.  Will treat in labor.    PLAN:  Proceed to Pitocin after Cytotec induction.  Pain management as needed.    Dictated By Sheba Nava M.D.  d: 06/12/2025 21:35:34  t: 06/12/2025 21:44:24  Job [id number]  /

## 2025-06-13 NOTE — ANESTHESIA PROCEDURE NOTES
Labor Analgesia    Date/Time: 6/13/2025 10:32 AM    Performed by: Elpidio Winn MD  Authorized by: Elpidio Winn MD      General Information and Staff    Start Time:  6/13/2025 10:32 AM  End Time:  6/13/2025 10:51 AM  Anesthesiologist:  Elpidio Winn MD  Performed by:  Anesthesiologist  Patient Location:  OB  Site Identification: surface landmarks    Reason for Block: labor epidural    Preanesthetic Checklist: patient identified, IV checked, risks and benefits discussed, monitors and equipment checked, pre-op evaluation, timeout performed, IV bolus, anesthesia consent and sterile technique used      Procedure Details    Patient Position:  Sitting  Prep: ChloraPrep    Monitoring:  Heart rate and continuous pulse ox  Approach:  Midline    Epidural Needle    Injection Technique:  JEANINE saline  Needle Type:  Tuohy  Needle Gauge:  17 G  Needle Length:  3.375 in  Needle Insertion Depth:  6  Location:  L3-4    Spinal Needle      Catheter    Catheter Type:  End hole  Catheter Size:  19 G  Catheter at Skin Depth:  12  Test Dose:  Negative    Assessment      Additional Comments

## 2025-06-14 LAB
BASOPHILS # BLD AUTO: 0.02 X10(3) UL (ref 0–0.2)
BASOPHILS NFR BLD AUTO: 0.1 %
EOSINOPHIL # BLD AUTO: 0.07 X10(3) UL (ref 0–0.7)
EOSINOPHIL NFR BLD AUTO: 0.4 %
ERYTHROCYTE [DISTWIDTH] IN BLOOD BY AUTOMATED COUNT: 12.9 %
HCT VFR BLD AUTO: 31.9 % (ref 35–48)
HGB BLD-MCNC: 10.7 G/DL (ref 12–16)
IMM GRANULOCYTES # BLD AUTO: 0.07 X10(3) UL (ref 0–1)
IMM GRANULOCYTES NFR BLD: 0.4 %
LYMPHOCYTES # BLD AUTO: 1.68 X10(3) UL (ref 1–4)
LYMPHOCYTES NFR BLD AUTO: 10.6 %
MCH RBC QN AUTO: 29.7 PG (ref 26–34)
MCHC RBC AUTO-ENTMCNC: 33.5 G/DL (ref 31–37)
MCV RBC AUTO: 88.6 FL (ref 80–100)
MONOCYTES # BLD AUTO: 1.36 X10(3) UL (ref 0.1–1)
MONOCYTES NFR BLD AUTO: 8.6 %
NEUTROPHILS # BLD AUTO: 12.69 X10 (3) UL (ref 1.5–7.7)
NEUTROPHILS # BLD AUTO: 12.69 X10(3) UL (ref 1.5–7.7)
NEUTROPHILS NFR BLD AUTO: 79.9 %
PLATELET # BLD AUTO: 149 10(3)UL (ref 150–450)
RBC # BLD AUTO: 3.6 X10(6)UL (ref 3.8–5.3)
WBC # BLD AUTO: 15.9 X10(3) UL (ref 4–11)

## 2025-06-14 PROCEDURE — 85025 COMPLETE CBC W/AUTO DIFF WBC: CPT | Performed by: OBSTETRICS & GYNECOLOGY

## 2025-06-14 NOTE — L&D DELIVERY NOTE
[facility]    PATIENT'S NAME: SHIRA CHAIREZ   ATTENDING PHYSICIAN: Sheba Nava M.D.   PATIENT ACCOUNT #: [id number] LOCATION:  [address] [place] [address]   MEDICAL RECORD #: [id number] YOB: 1995   ADMISSION DATE: 2025 DELIVERY DATE: 2025     DELIVERY NOTE    DELIVERING PHYSICIAN:  Libia De La Cruz MD    The patient is a 29-year-old female,  1, para 0, EDC 2025, admitted at 40 weeks' gestation for cervical ripening followed by induction of labor.  The patient's cervix on admission was noted to be 20%, -2, very posterior.  The patient was started on Cytotec and completed 6 doses of Cytotec followed by Pitocin.  She had artificial rupture of membranes performed, clear fluid, on 2025 at 5:47 a.m.  The patient shortly thereafter had epidural placed, and she progressed satisfactorily to complete cervical dilation.  She was noted to have a slightly elevated temperature at that time of 99.6.  She noted to have some mild fetal tachycardia.  Therefore, gentamicin and clindamycin were added to her beta strep ampicillin prophylaxis.  The patient was also given a dose of IV Tylenol, and she remained afebrile after that.  Shortly after that on 2025 at 1724, the patient began pushing.  She was noted to complete at 1713 and began pushing at 1724.  The patient continued to push and then had a normal spontaneous vaginal delivery of a viable male infant.  Birth weight was 7 pounds 7.6 ounces, 3390 g.  Apgars were 8 and 9 on 2025 at 19:48:22.  Delayed cord clamping was performed.  The infant was placed on the maternal abdomen.  Cord blood was collected.  The patient had a significant amount of blood loss, and the placenta was partially delivered, so manual removal was used to deliver the remainder of the placenta, so that the patient would not bleed.  Following delivery of the placenta, Pitocin was initiated.  The patient had some mild uterine atony.  She was given a dose  of Methergine and then TXA was initiated.  The patient had a midline second-degree laceration which was repaired with 3-0 chromic.  The cervix, vagina, and rectum were visually and manually inspected and noted to be intact.  QBL was noted to be 570.    ASSESSMENT:    1.   Intrauterine pregnancy at 40-1/7 weeks, status post normal vaginal delivery.  2.   History of positive beta strep, status post ampicillin prophylaxis.    3.   Mild uterine atony, status post Methergine and TXA prophylactically.  Did not meet criteria for postpartum hemorrhage.    4.   History of asthma.  5.   Increased body mass index.    6.   Anemia.    PLAN:  Routine postpartum care.  Mom and infant presently doing well.    Dictated By Libia De La Cruz M.D.  d: 06/13/2025 20:30:43  t: 06/13/2025 20:40:42  [facility] [id number]  [facility]/

## 2025-06-14 NOTE — PROGRESS NOTES
[facility]  Post-Partum Vaginal Delivery Progress Note    Kimberly Rodriguez Patient Status:  Inpatient    1995 MRN [id number]   Location [facility] 1SW-J Attending Sheba Nava MD, MD   Hosp Day # 2 PCP No primary care provider on file.     SUBJECTIVE:    Postpartum Day 1 s/p vaginal delivery    The patient is without complaints.  Pain is well controlled with current medications.     Baby is breastfeeding.    OBJECTIVE:    Vital signs in last 24 hours:  Temp:  [97.7 °F (36.5 °C)-99.6 °F (37.6 °C)] 98.2 °F (36.8 °C)  Pulse:  [] 65  Resp:  [16-20] 18  BP: ()/(51-85) 118/51  SpO2:  [96 %-99 %] 99 %    Data Reviewed:  Lab Results   Component Value Date    WBC 15.9 2025    HGB 10.7 2025    HCT 31.9 2025    .0 2025       Intake/Output:    Intake/Output Summary (Last 24 hours) at 2025 0852  Last data filed at 2025 0012  Gross per 24 hour   Intake 4953.18 ml   Output 1545 ml   Net 3408.18 ml        ASSESSMENT:    Post Partum Day # 1 S/P Normal Spontaneous Vaginal Delivery.  Anemia    PLAN:    Venofer x 1  Routine Post partum care.  Probable Discharge home tomorrow in am.      Libia De La Cruz MD  2025  8:52 AM

## 2025-06-14 NOTE — PROGRESS NOTES
Pt up to BR with assistance from this RN. Gait steady. PT voids without difficulty.  Selina-bottle given. Ice, tucks, and dermoplast to perineum. Pt up to WC without incident.

## 2025-06-14 NOTE — PLAN OF CARE
Problem: Patient/Family Goals  Goal: Patient/Family Long Term Goal  Description: Patient's Long Term Goal: uncomplicated vaginal delivery    Interventions:  - See additional Care Plan goals for specific interventions  Outcome: Completed  Goal: Patient/Family Short Term Goal  Description: Patient's Short Term Goal: effective pain and anxiety management    Interventions:   - See additional Care Plan goals for specific interventions  Outcome: Completed     Problem: BIRTH - VAGINAL/ SECTION  Goal: Fetal and maternal status remain reassuring during the birth process  Description: INTERVENTIONS:  - Monitor vital signs  - Monitor fetal heart rate  - Monitor uterine activity  - Monitor labor progression (vaginal delivery)  - DVT prophylaxis (C/S delivery)  - Surgical antibiotic prophylaxis (C/S delivery)  Outcome: Completed     Problem: PAIN - ADULT  Goal: Verbalizes/displays adequate comfort level or patient's stated pain goal  Description: INTERVENTIONS:  - Encourage pt to monitor pain and request assistance  - Assess pain using appropriate pain scale  - Administer analgesics based on type and severity of pain and evaluate response  - Implement non-pharmacological measures as appropriate and evaluate response  - Consider cultural and social influences on pain and pain management  - Manage/alleviate anxiety  - Utilize distraction and/or relaxation techniques  - Monitor for opioid side effects  - Notify MD/LIP if interventions unsuccessful or patient reports new pain  - Anticipate increased pain with activity and pre-medicate as appropriate  Outcome: Completed     Problem: ANXIETY  Goal: Will report anxiety at manageable levels  Description: INTERVENTIONS:  - Administer medication as ordered  - Teach and rehearse alternative coping skills  - Provide emotional support with 1:1 interaction with staff  Outcome: Completed

## 2025-06-14 NOTE — PROGRESS NOTES
Bedside report endorsed to SHABBIR Westbrook RN. Care assumed. Patient pushing with Dr De La Cruz and RN at bedside. Patients mother and wife supportive at bedside.

## 2025-06-14 NOTE — PROGRESS NOTES
Admitted to mother/baby room 1115 in stable condition. Postpartum education initiated. Bed in low position, call light in reach. Instructed pt to call for assistance when getting out of bed. Updated on plan of care. Voiced understanding.

## 2025-06-15 VITALS
TEMPERATURE: 98 F | BODY MASS INDEX: 34.32 KG/M2 | OXYGEN SATURATION: 99 % | SYSTOLIC BLOOD PRESSURE: 123 MMHG | HEART RATE: 74 BPM | DIASTOLIC BLOOD PRESSURE: 75 MMHG | HEIGHT: 65 IN | RESPIRATION RATE: 16 BRPM | WEIGHT: 206 LBS

## 2025-06-15 NOTE — L&D DELIVERY NOTE
Hayder Rodriguez [[id number]]      Labor Events     labor?: No   steroids?: None  Antibiotics received during labor?: Yes  Antibiotics (enter # doses in comment): ampicillin, clindamycin, gentamicin (Comment: amp x5 doses, clinda x1, gent x1)  Rupture date/time: 2025 0547     Rupture type: AROM  Fluid color: Clear, Pink  Labor type: Induced Onset of Labor  Induction: Misoprostol, Oxytocin, AROM  Indications for induction: Elective  Intrapartum & labor complications: Group B beta strep +, Fetal tachycardia, Late decelerations, Other - see comments  Intrapartum & labor complications comment: Prolonged decel, late decel, antibiotics started for rising temperature       Labor Length    1st stage: 7h 53m  2nd stage: 2h 35m  3rd stage: 0h 01m       Labor Event Times    Labor onset date/time: 2025 0920  Dilation complete date/time: 2025 1713  Start pushing date/time: 2025 17:24        Presentation    Presentation: Vertex  Position: Occiput Anterior       Operative Delivery    Operative Vaginal Delivery: No                Shoulder Dystocia    Shoulder Dystocia: No       Anesthesia    Method: Epidural              [place] Delivery      Head delivery date/time: 2025 19:48:22   Delivery date/time:  25 19:48:34   Delivery type: Normal spontaneous vaginal delivery    Details:     Delivery location: delivery room       Delivery Providers    Delivering Clinician: Libia De La Cruz MD   Delivery personnel:  Provider Role   Kourtney Barragan, SULMA Baby Nurse   Lis Westbrook RN Delivery Nurse             Cord    Vessels: 3 Vessels  Complications: None  Timed cord clamping: Yes  Time in sec: 45  Cord blood disposition: to lab  Gases sent?: No       Resuscitation    Method: None        Measurements      Weight: 3390 g 7 lb 7.6 oz Length: 54.6 cm     Head circum.: 35.5 cm Chest circum.: 33 cm      Abdominal circum.: 33 cm           Placenta    Date/time: 2025  19:50  Removal: Manual Removal  Appearance: Intact  Disposition: held for future pathology       Apgars    Living status: Living   Apgar Scoring Key:    0 1 2    Skin color Blue or pale Acrocyanotic Completely pink    Heart rate Absent <100 bpm >100 bpm    Reflex irritability No response Grimace Cry or active withdrawal    Muscle tone Limp Some flexion Active motion    Respiratory effort Absent Weak cry; hypoventilation Good, crying              1 Minute:  5 Minute:  10 Minute:  15 Minute:  20 Minute:      Skin color: 0  1       Heart rate: 2  2       Reflex irritablity: 2  2       Muscle tone: 2  2       Respiratory effort: 2  2       Total: 8  9          Apgars assigned by: STAR QUISPE RN   disposition: with mother       Skin to Skin    Skin to skin initiated date/time: 2025  Skin to skin with: Mother       Vaginal Count    Initial count RN: Mary Yost RN  Initial count Tech: Mary Li   Sharps    Initial counts 11   0    Final counts 11   1    Final count RN: Mary Yost RN  Final count MD: Libia De La Cruz MD       Lacerations    Episiotomy: None  Perineal lacerations: 2nd Repaired?: Yes     Vaginal laceration?: No      Cervical laceration?: No    Clitoral laceration?: No    Quantitative blood loss (mL): 401

## 2025-06-15 NOTE — L&D DELIVERY NOTE
Hayder Rodriguez [[id number]]      Labor Events     labor?: No   steroids?: None  Antibiotics received during labor?: Yes  Antibiotics (enter # doses in comment): ampicillin, clindamycin, gentamicin (Comment: amp x5 doses, clinda x1, gent x1)  Rupture date/time: 2025 0547     Rupture type: AROM  Fluid color: Clear, Pink  Labor type: Induced Onset of Labor  Induction: Misoprostol, Oxytocin, AROM  Indications for induction: Elective  Intrapartum & labor complications: Group B beta strep +, Fetal tachycardia, Late decelerations, Other - see comments  Intrapartum & labor complications comment: Prolonged decel, late decel, antibiotics started for rising temperature       Labor Length    1st stage: 7h 53m  2nd stage: 2h 35m  3rd stage: 0h 01m       Labor Event Times    Labor onset date/time: 2025 0920  Dilation complete date/time: 2025 1713  Start pushing date/time: 2025 17:24        Presentation    Presentation: Vertex  Position: Occiput Anterior       Operative Delivery    Operative Vaginal Delivery: No                Shoulder Dystocia    Shoulder Dystocia: No       Anesthesia    Method: Epidural              [place] Delivery      Head delivery date/time: 2025 19:48:22   Delivery date/time:  25 19:48:34   Delivery type: Normal spontaneous vaginal delivery    Details:     Delivery location: delivery room       Delivery Providers    Delivering Clinician: Libia De La Cruz MD   Delivery personnel:  Provider Role   Kourtney Barragan, SULMA Baby Nurse   Lis Westbrook RN Delivery Nurse             Cord    Vessels: 3 Vessels  Complications: None  Timed cord clamping: Yes  Time in sec: 45  Cord blood disposition: to lab  Gases sent?: No       Resuscitation    Method: None        Measurements      Weight: 3390 g 7 lb 7.6 oz Length: 54.6 cm     Head circum.: 35.5 cm Chest circum.: 33 cm      Abdominal circum.: 33 cm           Placenta    Date/time: 2025  19:50  Removal: Manual Removal  Appearance: Intact  Disposition: held for future pathology       Apgars    Living status: Living   Apgar Scoring Key:    0 1 2    Skin color Blue or pale Acrocyanotic Completely pink    Heart rate Absent <100 bpm >100 bpm    Reflex irritability No response Grimace Cry or active withdrawal    Muscle tone Limp Some flexion Active motion    Respiratory effort Absent Weak cry; hypoventilation Good, crying              1 Minute:  5 Minute:  10 Minute:  15 Minute:  20 Minute:      Skin color: 0  1       Heart rate: 2  2       Reflex irritablity: 2  2       Muscle tone: 2  2       Respiratory effort: 2  2       Total: 8  9          Apgars assigned by: STAR QUISPE RN   disposition: with mother       Skin to Skin    Skin to skin initiated date/time: 2025  Skin to skin with: Mother       Vaginal Count    Initial count RN: Mary Yost RN  Initial count Tech: Mary Li   Sharps    Initial counts 11   0    Final counts 11   1    Final count RN: Mary Yost RN  Final count MD: Libia De La Cruz MD       Lacerations    Episiotomy: None  Perineal lacerations: 2nd Repaired?: Yes     Vaginal laceration?: No      Cervical laceration?: No    Clitoral laceration?: No    Quantitative blood loss (mL): 401

## 2025-06-15 NOTE — PROGRESS NOTES
Discharge instructions reviewed with, and copy given to, patient.  Patient verbalized understanding of all instructions and denied further questions.  Pt discharged in stable condition, with infant and partner.

## 2025-06-15 NOTE — PROGRESS NOTES
[facility]  Post-Partum Vaginal Delivery Progress Note    Kimberly Rodriguez Patient Status:  Inpatient    1995 MRN [id number]   Location [facility] 1SW-J Attending Sheba Nava MD, MD   Hosp Day # 3 PCP No primary care provider on file.     SUBJECTIVE:    Postpartum Day 2 s/p vaginal delivery    The patient is without complaints.  Pain is well controlled with current medications.     Baby is breastfeeding.    OBJECTIVE:    Vital signs in last 24 hours:  Temp:  [97.8 °F (36.6 °C)] 97.8 °F (36.6 °C)  Pulse:  [79] 79  Resp:  [16] 16  BP: (118)/(68) 118/68    Data Reviewed:       Intake/Output:  No intake or output data in the 24 hours ending 06/15/25 0805     ASSESSMENT:    Post Partum Day # 2 S/P Normal Spontaneous Vaginal Delivery      PLAN:    Continue present management.  Discharge instructions given.  Home today.   Call as needed.      Libia De La Cruz MD  6/15/2025  8:05 AM

## 2025-06-17 ENCOUNTER — TELEPHONE (OUTPATIENT)
Dept: OBGYN UNIT | Facility: HOSPITAL | Age: 30
End: 2025-06-17

## 2025-06-17 ENCOUNTER — ANESTHESIA EVENT (OUTPATIENT)
Dept: OBGYN CLINIC | Facility: HOSPITAL | Age: 30
End: 2025-06-17
Payer: COMMERCIAL

## 2025-06-24 NOTE — PROGRESS NOTES
Labor Analgesia Follow Up Note    Patient underwent epidural anesthesia for labor analgesia,    Placenta Date/Time: 6/13/2025  7:50 PM    Delivery Date/Time:: 6/13/2025  7:48 PM    /75 (BP Location: Right arm)   Pulse 74   Temp 97.8 °F (36.6 °C) (Oral)   Resp 16   Ht 1.651 m (5' 5\")   Wt 93.4 kg (206 lb)   LMP 09/05/2024   SpO2 99%   Breastfeeding Yes   BMI 34.28 kg/m²     Assessment:  Patient seen and no apparent anesthesia related complications.    Thank you for asking us to participate in the care of your patient.

## (undated) NOTE — ED AVS SNAPSHOT
Jessica Sibley   MRN: [id number]    Department:  [facility] Emergency Department   Date of Visit:  7/8/2018           Disclosure     Insurance plans vary and the physician(s) referred by the ER may not be covered by your plan.  Please contact tell this physician (or your personal doctor if your instructions are to return to your personal doctor) about any new or lasting problems. The primary care or specialist physician will see patients referred from the [facility] Emergency Department.  Dipti Nguyen

## (undated) NOTE — LETTER
10/19/2019          To Whom It May Concern:    Doretha Fragoso is currently under my medical care .   She was seen today for a physical exam.  Her exam was normal.  Blood pressure 118/78, pulse 69, temperature 98.2 °F (36.8 °C), temperature source Oral,

## (undated) NOTE — LETTER
Patient Name: Attila Veliz  : 1995  MRN: [id number]  Patient Address: [facility]   [address] [place] [address]      Coronavirus Disease 2019 (COVID-19)     [facility] is committed to the safety and well-being of our patients, corrine carefully. If your symptoms get worse, call your healthcare provider immediately. 3. Get rest and stay hydrated.    4. If you have a medical appointment, call the healthcare provider ahead of time and tell them that you have or may have COVID-19.  5. For m of fever-reducing medications; and  · Improvement in respiratory symptoms (e.g., cough, shortness of breath); and  · At least 10 days have passed since symptoms first appeared OR if asymptomatic patient or date of symptom onset is unclear then use 10 days donors must:    · Have had a confirmed diagnosis of COVID-19  · Be symptom-free for at least 14 days*    *Some people will be required to have a repeat COVID-19 test in order to be eligible to donate.  If you’re instructed by Lucian that a repeat test is r random. Researchers are trying to identify similarities between people with a Post-COVID condition to better understand if there are risk factors. How do I prevent a Post-COVID condition?   The best way to prevent the long-term symptoms of COVID-19 is

## (undated) NOTE — MR AVS SNAPSHOT
[facility] [place] [address] [address] [id number]  [phone]               Thank you for choosing us for your health care visit with Catia Seo MD.  We are glad to serve you and happy to provide you with this summary of